# Patient Record
Sex: FEMALE | Race: WHITE | NOT HISPANIC OR LATINO | Employment: OTHER | URBAN - NONMETROPOLITAN AREA
[De-identification: names, ages, dates, MRNs, and addresses within clinical notes are randomized per-mention and may not be internally consistent; named-entity substitution may affect disease eponyms.]

---

## 2017-09-15 ENCOUNTER — HISTORY (OUTPATIENT)
Dept: FAMILY MEDICINE | Facility: OTHER | Age: 77
End: 2017-09-15

## 2017-09-15 ENCOUNTER — OFFICE VISIT - GICH (OUTPATIENT)
Dept: FAMILY MEDICINE | Facility: OTHER | Age: 77
End: 2017-09-15

## 2017-09-15 DIAGNOSIS — R30.0 DYSURIA: ICD-10-CM

## 2017-09-15 DIAGNOSIS — N30.00 ACUTE CYSTITIS WITHOUT HEMATURIA: ICD-10-CM

## 2017-09-15 LAB
BACTERIA URINE: ABNORMAL BACTERIA/HPF
BILIRUB UR QL: NEGATIVE
CLARITY, URINE: ABNORMAL CLARITY
COLOR UR: YELLOW COLOR
EPITHELIAL CELLS: ABNORMAL EPI/HPF
GLUCOSE URINE: NEGATIVE MG/DL
KETONES UR QL: NEGATIVE MG/DL
LEUKOCYTE ESTERASE URINE: ABNORMAL
NITRITE UR QL STRIP: POSITIVE
OCCULT BLOOD,URINE - HISTORICAL: ABNORMAL
PH UR: 6.5 [PH]
PROTEIN QUALITATIVE,URINE - HISTORICAL: NEGATIVE MG/DL
RBC - HISTORICAL: ABNORMAL /HPF
SP GR UR STRIP: 1.01
UROBILINOGEN,QUALITATIVE - HISTORICAL: NORMAL EU/DL
WBC - HISTORICAL: >100 /HPF

## 2017-09-17 ENCOUNTER — COMMUNICATION - GICH (OUTPATIENT)
Dept: FAMILY MEDICINE | Facility: OTHER | Age: 77
End: 2017-09-17

## 2017-09-17 DIAGNOSIS — N30.00 ACUTE CYSTITIS WITHOUT HEMATURIA: ICD-10-CM

## 2017-09-17 LAB
CULTURE - HISTORICAL: ABNORMAL
CULTURE - HISTORICAL: ABNORMAL
SUSCEPTIBILITY RESULT - HISTORICAL: ABNORMAL

## 2017-09-22 ENCOUNTER — COMMUNICATION - GICH (OUTPATIENT)
Dept: FAMILY MEDICINE | Facility: OTHER | Age: 77
End: 2017-09-22

## 2017-09-25 ENCOUNTER — AMBULATORY - GICH (OUTPATIENT)
Dept: FAMILY MEDICINE | Facility: OTHER | Age: 77
End: 2017-09-25

## 2017-09-25 ENCOUNTER — HISTORY (OUTPATIENT)
Dept: FAMILY MEDICINE | Facility: OTHER | Age: 77
End: 2017-09-25

## 2017-10-03 ENCOUNTER — HISTORY (OUTPATIENT)
Dept: FAMILY MEDICINE | Facility: OTHER | Age: 77
End: 2017-10-03

## 2017-10-03 ENCOUNTER — OFFICE VISIT - GICH (OUTPATIENT)
Dept: FAMILY MEDICINE | Facility: OTHER | Age: 77
End: 2017-10-03

## 2017-10-03 DIAGNOSIS — N39.0 URINARY TRACT INFECTION: ICD-10-CM

## 2017-10-03 DIAGNOSIS — M25.551 PAIN IN RIGHT HIP: ICD-10-CM

## 2017-10-03 DIAGNOSIS — L71.9 ROSACEA: ICD-10-CM

## 2017-10-03 DIAGNOSIS — R30.0 DYSURIA: ICD-10-CM

## 2017-10-03 DIAGNOSIS — N81.11 CYSTOCELE, MIDLINE: ICD-10-CM

## 2017-10-03 LAB
BACTERIA URINE: ABNORMAL BACTERIA/HPF
BILIRUB UR QL: NEGATIVE
CLARITY, URINE: ABNORMAL CLARITY
COLOR UR: YELLOW COLOR
EPITHELIAL CELLS: ABNORMAL EPI/HPF
GLUCOSE URINE: NEGATIVE MG/DL
KETONES UR QL: NEGATIVE MG/DL
LEUKOCYTE ESTERASE URINE: ABNORMAL
NITRITE UR QL STRIP: POSITIVE
OCCULT BLOOD,URINE - HISTORICAL: ABNORMAL
PH UR: 5.5 [PH]
PROTEIN QUALITATIVE,URINE - HISTORICAL: NEGATIVE MG/DL
RBC - HISTORICAL: ABNORMAL /HPF
SP GR UR STRIP: <=1.005
UROBILINOGEN,QUALITATIVE - HISTORICAL: NORMAL EU/DL
WBC - HISTORICAL: ABNORMAL /HPF

## 2017-12-05 ENCOUNTER — HISTORY (OUTPATIENT)
Dept: FAMILY MEDICINE | Facility: OTHER | Age: 77
End: 2017-12-05

## 2017-12-05 ENCOUNTER — OFFICE VISIT - GICH (OUTPATIENT)
Dept: FAMILY MEDICINE | Facility: OTHER | Age: 77
End: 2017-12-05

## 2017-12-05 DIAGNOSIS — N81.10 CYSTOCELE, UNSPECIFIED (CODE): ICD-10-CM

## 2017-12-05 DIAGNOSIS — R39.9 UNSPECIFIED SYMPTOMS AND SIGNS INVOLVING THE GENITOURINARY SYSTEM: ICD-10-CM

## 2017-12-05 LAB
BACTERIA URINE: ABNORMAL BACTERIA/HPF
BILIRUB UR QL: ABNORMAL
CLARITY, URINE: ABNORMAL CLARITY
COLOR UR: ABNORMAL COLOR
EPITHELIAL CELLS: ABNORMAL EPI/HPF
GLUCOSE URINE: ABNORMAL MG/DL
KETONES UR QL: ABNORMAL MG/DL
LEUKOCYTE ESTERASE URINE: ABNORMAL
NITRITE UR QL STRIP: ABNORMAL
OCCULT BLOOD,URINE - HISTORICAL: ABNORMAL
PH UR: ABNORMAL [PH]
PROTEIN QUALITATIVE,URINE - HISTORICAL: ABNORMAL MG/DL
RBC - HISTORICAL: ABNORMAL /HPF
SP GR UR STRIP: 1.01
UROBILINOGEN,QUALITATIVE - HISTORICAL: ABNORMAL EU/DL
WBC - HISTORICAL: ABNORMAL /HPF

## 2017-12-09 ENCOUNTER — AMBULATORY - GICH (OUTPATIENT)
Dept: SCHEDULING | Facility: OTHER | Age: 77
End: 2017-12-09

## 2017-12-27 NOTE — PROGRESS NOTES
"Patient Information     Patient Name MRN Sex     Carolyne Medley [id number] Female 1940      Progress Notes by Jackie Gtz MD at 10/3/2017  4:00 PM     Author:  Jackie Gtz MD Service:  (none) Author Type:  Physician     Filed:  10/3/2017  4:57 PM Encounter Date:  10/3/2017 Status:  Signed     :  Jackie Gtz MD (Physician)            SUBJECTIVE: Carolyne Medley is a 77 y.o. female who complains of intermittent burning with urination and has a rather large cystocele that at times causes more pressure and seems to bulge out more and at times to introitus but \"not below the opening\". Kegel exercises help.Had mild dysuria yesterday and up every hour during the night last night but not this afternoon. Had recent UTI and medication started was Septra but not sensitive so changed to macrodantin. Urine culture reviewed. Has had YASMANY with BSO and bladder suspension and then recurrence . She really thinks that the UTI was totally cleared up she would not have as much problem with the sensation of descensus.    OBJECTIVE:  /70  Pulse 62  Wt 61.7 kg (136 lb)  BMI 24.56 kg/m2     Appears well, in no apparent distress. The abdomen is soft without tenderness, guarding, mass, rebound or organomegaly. No CVA tenderness or inguinal adenopathy noted.   Large gaping introitus and with minimal Valsalva anterior vaginal wall and cystocele protruded 2 and out of the introitus.  Results for orders placed or performed in visit on 10/03/17      URINALYSIS W REFLEX MICROSCOPIC IF POSITIVE      Result  Value Ref Range    COLOR                     Yellow Yellow Color    CLARITY                   Slightly Cloudy (A) Clear Clarity    SPECIFIC GRAVITY,URINE    <=1.005 (A) 1.010, 1.015, 1.020, 1.025                    PH,URINE                  5.5 6.0, 7.0, 8.0, 5.5, 6.5, 7.5, 8.5                    UROBILINOGEN,QUALITATIVE  Normal Normal EU/dl    PROTEIN, URINE Negative Negative mg/dL    GLUCOSE, " URINE Negative Negative mg/dL    KETONES,URINE             Negative Negative mg/dL    BILIRUBIN,URINE           Negative Negative                    OCCULT BLOOD,URINE        Small (A) Negative                    NITRITE                   Positive (A) Negative                    LEUKOCYTE ESTERASE        Large (A) Negative                   URINALYSIS MICROSCOPIC      Result  Value Ref Range    RBC 0-2 0-2, None Seen /HPF    WBC 26-50 (A) 0-2, 3-5, None Seen /HPF    BACTERIA                  Many (A) None Seen, Rare, Occasional, Few Bacteria/HPF    EPITHELIAL CELLS          Few None Seen, Few Epi/HPF     I have personally reviewed the labs listed above.  Urine culture pending.    ASSESSMENT:   1. Recurrent UTI    2. Midline cystocele    3. Burning with urination    4. Rosacea    5. Right hip pain            PLAN:   Treatment per orders and prescription for Cipro given pending culture results. - also push fluids, may use Pyridium OTC prn.   Continue Kegel exercises.  Discussed other options short of surgery such as a pessary which she could consider but would have to see GYN to discuss.  Call or return to clinic prn if these symptoms worsen or fail to improve as anticipated.  Jackie Gtz MD  4:57 PM 10/3/2017

## 2017-12-28 NOTE — TELEPHONE ENCOUNTER
Patient Information     Patient Name Carolyne Tiwari [id number] Female 1940      Telephone Encounter by Sherrie Rahman NP at 2017  3:22 PM     Author:  Sherrie Rahman NP Service:  (none) Author Type:  PHYS- Nurse Practitioner     Filed:  2017  3:24 PM Encounter Date:  2017 Status:  Signed     :  Sherrie Rahman NP (PHYS- Nurse Practitioner)            She should keep area clean and moist. May use topical treatments such as vaseline to help with irritation. I would recommend she f/u with Dr Gtz to discuss prolapsed uterus and possible referrals. SHERRIE RAHMAN NP ....................  2017   3:23 PM

## 2017-12-28 NOTE — TELEPHONE ENCOUNTER
Patient Information     Patient Name MRN Carolyne Moran [id number] Female 1940      Telephone Encounter by Diane Loco at 2017  3:02 PM     Author:  Diane Loco Service:  (none) Author Type:  NURS- Student Practical Nurse     Filed:  2017  3:05 PM Encounter Date:  2017 Status:  Signed     :  Diane Loco (NURS- Student Practical Nurse)            Patient has 1 more day of antibiotic and states that it has helped. Patient states that she has a prolapsed bladder and it protrudes and gets irritation. Patient states that makes her feels as though she has a bladder infection.  Patient would like to know what she should do for it. Patient would like to know if she could get a referral for urology.  Please advise. Diane Loco LPN .............2017  3:05 PM

## 2017-12-28 NOTE — TELEPHONE ENCOUNTER
Patient Information     Patient Name MRN Carolyne Moran [id number] Female 1940      Telephone Encounter by Diane Loco at 2017  4:14 PM     Author:  Diane Loco Service:  (none) Author Type:  NURS- Student Practical Nurse     Filed:  2017  4:14 PM Encounter Date:  2017 Status:  Signed     :  Diane Loco (NURS- Student Practical Nurse)            Spoke with patient and relayed results. Transferred to scheduling line. Diane Loco LPN .............2017  4:14 PM

## 2017-12-28 NOTE — TELEPHONE ENCOUNTER
Patient Information     Patient Name Carolyne Tiwari [id number] Female 1940      Telephone Encounter by Sherrie Rahman NP at 2017  1:36 PM     Author:  Sherrie Rahman NP Service:  (none) Author Type:  PHYS- Nurse Practitioner     Filed:  2017  1:38 PM Encounter Date:  2017 Status:  Signed     :  Sherrie Rahman NP (PHYS- Nurse Practitioner)            Please call and let her know that UC shows infection that is not covered by Bactrim. I sent a new Rx for macrobid twice daily for 7 days. She should stop Bactrim. SHERRIE RAHMAN NP ....................  2017   1:38 PM

## 2017-12-28 NOTE — TELEPHONE ENCOUNTER
Patient Information     Patient Name MRN Carolyne Moran [id number] Female 1940      Telephone Encounter by Marily Swan at 2017  2:09 PM     Author:  Marily Swan Service:  (none) Author Type:  NURS- Student Practical Nurse     Filed:  2017  2:09 PM Encounter Date:  2017 Status:  Signed     :  Marily Swan (NURS- Student Practical Nurse)            Left message to call back  ....................  2017   2:09 PM   Marily Swan LPN............................ 2017 2:09 PM

## 2017-12-28 NOTE — PATIENT INSTRUCTIONS
Patient Information     Patient Name Carolyne Tiwari [id number] Female 1940      Patient Instructions by Sherrie Rodriguez NP at 9/15/2017 11:15 AM     Author:  Sherrie Rodriguez NP Service:  (none) Author Type:  PHYS- Nurse Practitioner     Filed:  9/15/2017 11:58 AM Encounter Date:  9/15/2017 Status:  Signed     :  Sherrie Rodriguez NP (PHYS- Nurse Practitioner)            Antibiotics per order.  AZO may be purchased over the counter and can help with symptoms until the antibiotics start working  Drink plenty of fluids.   Return to clinic if any fevers, vomiting, or flank pain develops as this may be a sign the infection has moved to your kidney's.  We have initiated a urine culture. If any changes are needed in your antibiotics, we will notify you when the results have returned.

## 2017-12-28 NOTE — TELEPHONE ENCOUNTER
Patient Information     Patient Name MRN Carolyne Moran [id number] Female 1940      Telephone Encounter by Marily Swan at 2017  3:47 PM     Author:  Marily Swan Service:  (none) Author Type:  NURS- Student Practical Nurse     Filed:  2017  3:48 PM Encounter Date:  2017 Status:  Signed     :  Marily Swan (NURS- Student Practical Nurse)            Patient returned this writer's phone call. After proper verification, informed patient of below information. Patient states understanding and no further questions at this time.    Marily Swan LPN............................ 2017 3:48 PM

## 2017-12-28 NOTE — PATIENT INSTRUCTIONS
Patient Information     Patient Name MRCarolyne Calderon [id number] Female 1940      Patient Instructions by Jackie Gtz MD at 10/3/2017  4:47 PM     Author:  Jackie Gtz MD Service:  (none) Author Type:  Physician     Filed:  10/3/2017  4:47 PM Encounter Date:  10/3/2017 Status:  Signed     :  Jackie Gtz MD (Physician)               Index [employer]ian   Urinary Tract Infection in Women   ________________________________________________________________________  KEY POINTS    A urinary tract infection is an infection of your kidneys, ureters, bladder, or urethra.    Your healthcare provider will likely prescribe an antibiotic and medicine to help relieve burning and discomfort.    Follow the full course of treatment prescribed by your healthcare provider. If you were prescribed an antibiotic, take all of it as prescribed, even if your symptoms are gone.  ________________________________________________________________________  What is a urinary tract infection?  Urinary tract infection (UTI) is an infection of one or more parts of the urinary tract. The urinary tract includes your:    Kidneys, which make urine    Ureters, which are the tubes that carry urine from the kidneys to the bladder    Bladder, which stores urine    Urethra, which is the tube that drains urine from the bladder  What is the cause?  Urinary tract infection is usually caused by bacteria. Normally the urinary tract does not have any bacteria or other organisms in it. Bacteria that cause a UTI often spread from the rectum or vagina to the urethra and then to the bladder or kidneys. Urinary tract infection is common in women because the urethra is short. This makes it easier for bacteria to move up to the bladder. Sometimes bacteria spread from another part of the body through the bloodstream to the urinary tract.  Some of the things that can lead to an infection are:    A blockage in the urinary tract, such  as a kidney stone    A sexually transmitted disease or infection (also called an STD or STI)    Getting older, when it may get harder to empty and flush out the bladder completely    Having medical problems such as diabetes, a problem with the immune system, sickle cell anemia, stroke, kidney stones, or any illness or disability that makes it hard to empty your bladder completely    Use of a catheter to drain the bladder    Scarring in the urinary tract from previous infections or surgery  You are more likely to have an infection if:    You just started having sex or have a new sex partner    You are past menopause    You are pregnant  What are the symptoms?  Symptoms may include:    Urinating more often    Feeling an urgent need to urinate or feeling that your bladder is always full    Pain or burning when you urinate    Pain in your lower belly, low back, or your side    Urine that smells bad    Urine that looks cloudy, reddish, or bloody    Fever and chills or sweating    Nausea and vomiting    Leaking of urine    Pain during sex  How is it diagnosed?  Your healthcare provider will ask about your symptoms and medical history and examine you. Tests to diagnose a simple urinary tract infection may include:    Urine tests    Blood tests  If you are having more serious symptoms or frequent infections, you may need one or more tests:    An intravenous pyelogram (IVP), which is a series of X-rays taken after your healthcare provider injects contrast dye into your blood vessels to look for blockages in your kidneys and urinary tract    An ultrasound, which uses sound waves to show pictures of the kidneys and urinary tract    A pelvic exam    A cystoscopy, which uses a slim, flexible, lighted tube passed through your urethra into your bladder, and usually done by a specialist called a urologist  How is it treated?  Your healthcare provider will most likely prescribe an antibiotic and medicine to help relieve burning and  discomfort. Prompt treatment of a UTI usually relieves the symptoms in 1 to 2 days. If your infection has been causing symptoms for several days before treatment or if you have a fever, it may take longer to feel better.  It s important to get prompt treatment for a UTI. If the infection is not treated, it could damage your kidneys and make you very sick. If the infection spreads to your blood, it can be life-threatening. If you are very sick, you may need to be in the hospital and get antibiotics by IV.  How can I take care of myself?  Follow the full course of treatment prescribed by your healthcare provider. If you were prescribed an antibiotic medicine, take the antibiotics for as long as your healthcare provider prescribes, even if you feel better. If you stop taking the medicine too soon, you may not kill all of the bacteria and you may get sick again. If you have side effects from your medicine, talk to your healthcare provider.  Ask your provider:    How and when you will get your test results    How long it will take to recover    If there are activities you should avoid and when you can return to your normal activities    How to take care of yourself at home    What symptoms or problems you should watch for and what to do if you have them    Make sure you know when you should come back for a checkup.    Drink plenty of water each day to cleanse your bladder and urinary tract unless your healthcare provider has told you to limit how much fluid you drink.    Soaking in a tub of warm water for 20 to 30 minutes may help relieve pain.  How can I help prevent urinary tract infection?  You can help prevent UTIs if you:    Drink enough liquids to keep your urine light yellow in color.    Drink a glass of cranberry juice each day. The juice should be real cranberry juice, not a cranberry-flavored drink.    Don t wait to go to the bathroom if you feel the need to urinate.    Practice safe sex:    Ask your healthcare  provider which type of condom, diaphragm, or other birth control is right for you.    Urinate soon after sex.    Keep your genital area clean. If you want to have vaginal sex after anal sex, both partners should wash their genitals first.    Empty your bladder completely when you urinate.    Don t wear a wet bathing suit for long periods of time.    Don t use irritating cosmetics or chemicals in your genital area. This includes, for example, strong soaps, feminine hygiene sprays, douches, scented tampons, sanitary napkins, or panty liners.    Keep your vaginal area clean. Wiping from front to back after using the toilet may help prevent infections. Use mild, unscented soap to wash your genital area gently each time you bathe or shower.    Wear underwear that is all cotton or has a cotton crotch. Pantyhose should also have a cotton crotch. Cotton absorbs moisture better than nylon. Change underwear and pantyhose every day.    During pregnancy, tell your healthcare provider if you often have urinary tract problems.  If you have reached menopause and are not taking estrogen, prescription estrogen vaginal cream may help prevent bladder infections.  Developed by [employer].  Adult Advisor 2016.2 published by [employer].  Last modified: 2016-04-27  Last reviewed: 2016-04-26  This content is reviewed periodically and is subject to change as new health information becomes available. The information is intended to inform and educate and is not a replacement for medical evaluation, advice, diagnosis or treatment by a healthcare professional.  References   Adult Advisor 2016.2 Index    Copyright   2016 [employer], a division of [employer] All rights reserved.

## 2017-12-28 NOTE — PROGRESS NOTES
Patient Information     Patient Name MRN Sex Carolyne Mann [id number] Female 1940      Progress Notes by Sherrie Rordiguez NP at 9/15/2017 11:15 AM     Author:  Sherrie Rodriguez NP Service:  (none) Author Type:  PHYS- Nurse Practitioner     Filed:  9/15/2017  1:28 PM Encounter Date:  9/15/2017 Status:  Signed     :  Sherrie Rodriguez NP (PHYS- Nurse Practitioner)            HPI:    Carolyne Medley is a 77 y.o. female who presents to clinic today for urinary concerns. She has had 2 day history of burning, frequency, cloudy urine. Having some pelvic discomfort. No fevers. No hematuria. No n/v or back pain. She has taken doxycycline 50 mg x4 tablets for sx that she has on hand for her rosacea. No hx of frequent UTI's. Does not smoke.     Past Medical History:     Diagnosis  Date     H/O rosacea      History of pregnancy           Past Surgical History:      Procedure  Laterality Date     YASMANY AND BSO      in the [facility]       Social History        Substance Use Topics          Smoking status:   Former Smoker      Quit date:  1964      Smokeless tobacco:   Never Used      Alcohol use   0.5 oz/week     1 Shots of liquor per week        Comment: occasional       Current Outpatient Prescriptions       Medication  Sig Dispense Refill     albuterol HFA (VENTOLIN HFA) 90 mcg/actuation inhaler Inhale 2 Puffs by mouth every 6 hours if needed for Shortness Of Breath. 1 Inhaler 1     Doxycycline Monohydrate (VIBRAMYCIN) 50 mg capsule Take 1 capsule by mouth 2 times daily. 60 capsule 2     estrogens conjugated (PREMARIN) 0.625 mg tablet Take 1 tablet by mouth once daily. 45 tablet 6     metroNIDAZOLE 0.75 % cream Apply  topically to affected area(s) once daily. 45 g 6     naproxen (NAPROSYN) 500 mg tablet Take 1 tablet by mouth 2 times daily with meals. 60 tablet 4     No current facility-administered medications for this visit.      Medications have been reviewed by me and are current to the best  of my knowledge and ability.    Allergies     Allergen  Reactions     Latex Rash     Penicillins Rash       ROS:  Pertinent positives and negatives are noted in HPI.    EXAM:  General appearance: well appearing female, in no acute distress  Respiratory: clear to auscultation bilaterally  Cardiac: RRR with no murmurs  Abdomen: soft, nontender, no masses or organomegally, no CVAT  Psychological: normal affect, alert and pleasant  Lab:   Results for orders placed or performed in visit on 09/15/17      URINALYSIS W REFLEX MICROSCOPIC IF POSITIVE      Result  Value Ref Range    COLOR                     Yellow Yellow Color    CLARITY                   Cloudy (A) Clear Clarity    SPECIFIC GRAVITY,URINE    1.010 1.010, 1.015, 1.020, 1.025                    PH,URINE                  6.5 6.0, 7.0, 8.0, 5.5, 6.5, 7.5, 8.5                    UROBILINOGEN,QUALITATIVE  Normal Normal EU/dl    PROTEIN, URINE Negative Negative mg/dL    GLUCOSE, URINE Negative Negative mg/dL    KETONES,URINE             Negative Negative mg/dL    BILIRUBIN,URINE           Negative Negative                    OCCULT BLOOD,URINE        Small (A) Negative                    NITRITE                   Positive (A) Negative                    LEUKOCYTE ESTERASE        Large (A) Negative                   URINALYSIS MICROSCOPIC      Result  Value Ref Range    RBC 3-5 (A) 0-2, None Seen /HPF    WBC >100 (A) 0-2, 3-5, None Seen /HPF    BACTERIA                  Many (A) None Seen, Rare, Occasional, Few Bacteria/HPF    EPITHELIAL CELLS          Few None Seen, Few Epi/HPF         ASSESSMENT/PLAN:    ICD-10-CM    1. Acute cystitis without hematuria N30.00 trimethoprim-sulfamethoxazole, 160-800 mg, (BACTRIM DS, SEPTRA DS) tablet      URINE CULTURE      URINE CULTURE   2. Dysuria R30.0 URINALYSIS W REFLEX MICROSCOPIC IF POSITIVE      URINALYSIS W REFLEX MICROSCOPIC IF POSITIVE      URINALYSIS MICROSCOPIC      URINALYSIS MICROSCOPIC      UA with many bacteria,  nitrites, small blood, large leukocytes. UC pending. Tx empirically with bactrim. Reviewed need to complete all antibiotics. Discussed typical course of illness, symptomatic treatment and when to return to clinic. Patient in agreement with plan and all questions were answered.     Patient Instructions   Antibiotics per order.  AZO may be purchased over the counter and can help with symptoms until the antibiotics start working  Drink plenty of fluids.   Return to clinic if any fevers, vomiting, or flank pain develops as this may be a sign the infection has moved to your kidney's.  We have initiated a urine culture. If any changes are needed in your antibiotics, we will notify you when the results have returned.

## 2017-12-30 NOTE — NURSING NOTE
Patient Information     Patient Name MRN Carolyne Moran [id number] Female 1940      Nursing Note by Diane Loco at 9/15/2017 11:15 AM     Author:  Diane Loco Service:  (none) Author Type:  NURS- Student Practical Nurse     Filed:  9/15/2017 11:41 AM Encounter Date:  9/15/2017 Status:  Signed     :  Diane Loco (NURS- Student Practical Nurse)            Patient presents with dysuria, frequency, cloudy urine for 2 days. Diane Loco LPN .............9/15/2017  11:34 AM

## 2017-12-31 ENCOUNTER — COMMUNICATION - GICH (OUTPATIENT)
Dept: FAMILY MEDICINE | Facility: OTHER | Age: 77
End: 2017-12-31

## 2017-12-31 DIAGNOSIS — M25.551 PAIN IN RIGHT HIP: ICD-10-CM

## 2018-01-04 ENCOUNTER — OFFICE VISIT - GICH (OUTPATIENT)
Dept: OBGYN | Facility: OTHER | Age: 78
End: 2018-01-04

## 2018-01-04 ENCOUNTER — HISTORY (OUTPATIENT)
Dept: OBGYN | Facility: OTHER | Age: 78
End: 2018-01-04

## 2018-01-04 DIAGNOSIS — Z79.890 HORMONE REPLACEMENT THERAPY (POSTMENOPAUSAL): ICD-10-CM

## 2018-01-04 DIAGNOSIS — N81.9 FEMALE GENITAL PROLAPSE: ICD-10-CM

## 2018-01-04 DIAGNOSIS — N30.00 ACUTE CYSTITIS WITHOUT HEMATURIA: ICD-10-CM

## 2018-01-06 LAB — CULTURE - HISTORICAL: NORMAL

## 2018-01-24 ENCOUNTER — DOCUMENTATION ONLY (OUTPATIENT)
Dept: FAMILY MEDICINE | Facility: OTHER | Age: 78
End: 2018-01-24

## 2018-01-24 PROBLEM — L71.9 ACNE ROSACEA: Status: ACTIVE | Noted: 2018-01-24

## 2018-01-24 PROBLEM — M54.9 BACKACHE: Status: ACTIVE | Noted: 2018-01-24

## 2018-01-24 PROBLEM — N81.10 FEMALE CYSTOCELE: Status: ACTIVE | Noted: 2017-12-05

## 2018-01-24 RX ORDER — NAPROXEN 500 MG/1
1 TABLET ORAL DAILY PRN
COMMUNITY
Start: 2018-01-04

## 2018-01-24 RX ORDER — ALBUTEROL SULFATE 90 UG/1
2 AEROSOL, METERED RESPIRATORY (INHALATION) EVERY 6 HOURS PRN
COMMUNITY
Start: 2015-05-12 | End: 2019-10-17

## 2018-01-24 RX ORDER — DIPHENOXYLATE HYDROCHLORIDE AND ATROPINE SULFATE 2.5; .025 MG/1; MG/1
1 TABLET ORAL DAILY
COMMUNITY
Start: 2017-12-05

## 2018-01-24 RX ORDER — DOXYCYCLINE 50 MG/1
CAPSULE ORAL
COMMUNITY
Start: 2017-10-03 | End: 2018-09-17

## 2018-01-25 VITALS — DIASTOLIC BLOOD PRESSURE: 70 MMHG | SYSTOLIC BLOOD PRESSURE: 100 MMHG | HEART RATE: 62 BPM | WEIGHT: 136 LBS

## 2018-01-25 VITALS
HEIGHT: 62 IN | BODY MASS INDEX: 25.1 KG/M2 | SYSTOLIC BLOOD PRESSURE: 112 MMHG | TEMPERATURE: 97.5 F | HEART RATE: 73 BPM | DIASTOLIC BLOOD PRESSURE: 64 MMHG | WEIGHT: 136.4 LBS

## 2018-02-09 VITALS
SYSTOLIC BLOOD PRESSURE: 120 MMHG | HEART RATE: 70 BPM | BODY MASS INDEX: 23.57 KG/M2 | DIASTOLIC BLOOD PRESSURE: 82 MMHG | WEIGHT: 133 LBS | HEIGHT: 63 IN

## 2018-02-09 VITALS
HEART RATE: 64 BPM | BODY MASS INDEX: 24.22 KG/M2 | TEMPERATURE: 96.5 F | WEIGHT: 135 LBS | SYSTOLIC BLOOD PRESSURE: 120 MMHG | DIASTOLIC BLOOD PRESSURE: 68 MMHG

## 2018-02-12 NOTE — PATIENT INSTRUCTIONS
Patient Information     Patient Name MRN Carolyne Moran [id number] Female 1940      Patient Instructions by Jackie Gtz MD at 2017 11:00 AM     Author:  Jackie Gtz MD Service:  (none) Author Type:  Physician     Filed:  2017 11:37 AM Encounter Date:  2017 Status:  Signed     :  Jackie Gtz MD (Physician)            Take cipro and you will be notified of urine culture results.

## 2018-02-12 NOTE — PROGRESS NOTES
Patient Information     Patient Name MRN Sex     Carolyne Medley [id number] Female 1940      Progress Notes by Karen Chun MD at 2018 11:15 AM     Author:  Karen Chun MD Service:  (none) Author Type:  Physician     Filed:  2018 12:55 PM Encounter Date:  2018 Status:  Signed     :  Karen Chun MD (Physician)            [facility]  Gynecology Consult    CC: cystocele    HPI: Carolyne Medley is a 77 y.o.  who presents in consultation for cystocele, recurrent UTIs. She reports a history of recurrent cystocele. She had an abdominal hysterectomy with a bladder repair in . This worked for about 20 years, then recurred and had a vaginal repair in . She does not think she has ever had a mesh procedure. This repair failed again a few years ago. The bulge is there but not particularly bothersome. She reports 2-3 UTIs this fall, which she only infrequently had before. She is wondering if this is related to her cystocele. When she urinates, she feels like she needs to do a Kegel to pull the cystocele back inside her vagina in order to completely empty her bladder but reports she is able to empty. She denies splinting or urinary leakage. She has some difficulty evacuating her bowels completely and also endorses hard stools.     OBHx: ,  x1    Past Medical History:     Diagnosis  Date     H/O rosacea      History of pregnancy              Past Surgical History:      Procedure  Laterality Date     CYSTOCELE REPAIR      [place]       YASMANY AND BSO      in the [facility]. With bladder suspension         Family History       Problem   Relation Age of Onset     Osteoporosis  Mother      Other  Mother      Dementia       Cancer-breast  No Family History        Social History        Substance Use Topics          Smoking status:   Former Smoker      Quit date:  1964      Smokeless tobacco:   Never Used      Alcohol use   0.5  oz/week     1 Shots of liquor per week        Comment: occasional     Lives with her  of 52 years. Retired, enjoys sewing.     Current Outpatient Prescriptions on File Prior to Visit       Medication  Sig Dispense Refill     albuterol HFA (VENTOLIN HFA) 90 mcg/actuation inhaler Inhale 2 Puffs by mouth every 6 hours if needed for Shortness Of Breath. 1 Inhaler 1     Doxycycline Monohydrate (VIBRAMYCIN) 50 mg capsule Take 1 capsule by mouth each time if needed for Other (Specify). 60 capsule 2     metroNIDAZOLE 0.75 % cream Apply  topically to affected area(s) once daily. 45 g 6     multivitamin (MVI) tablet Take 1 tablet by mouth once daily.  0     naproxen (NAPROSYN) 500 mg tablet Take one tablet by mouth once daily as needed. 60 tablet 4     No current facility-administered medications on file prior to visit.        Allergies:   Allergies      Allergen   Reactions     Latex  Rash     Nickel  Rash     Rash,runny nose, throat issue      Penicillins  Rash       Objective:   /68 (Cuff Site: Right Arm, Position: Sitting, Cuff Size: Adult Regular)  Pulse 64  Temp 96.5  F (35.8  C) (Tympanic)   Wt 61.2 kg (135 lb)  BMI 24.22 kg/m2  Constitutional: Healthy appearing female, no acute distress  Resp: nonlabored  Pelvic Exam - : External genitalia normal, healthy tissue.  No obvious excoriations, lesions, or rashes. Bartholins, urethra, skeins normal.  Normal pink vaginal mucosa. Uterus and cervix surgically absent. Well healed cuff without lesions. Grade 2 vault prolapse, grade 2-3 cystocele with valsalva, grade 1-2 rectocele with valsalva. Urethra well supported. No pelvic masses or urethral tenderness.   Psychiatric: mentation appears normal and affect normal    Straight cath post-void residual 90cc clear urine    Assessment/Plan:  77 y.o.  presenting with recurrent cystocele s/p several surgical repairs and UTIs. Discussed how cystoceles can cause urinary retention which can lead to recurrent  UTIs, however, her post-void residual volume was within normal limits. She also has constipation, which can increase risk of UTIs. Discussed managing constipation to see if this may decrease recurrence of UTI. Also discussed management options for prolapse, including pessary and surgical options. She has already failed two native tissue repairs and therefore would need to see a urogynecologist if she desired repair. She declines pessary and surgical management for now. Will treat constipation and RTC if UTI recurs despite this with plan for pessary placement. Also discussed her HRT. She started taking it for bone health in 1991 after her hysterectomy. Discussed that HRT is no longer indicated for primary disease prevention and is only indicated for vasomotor symptoms. Recommend stopping HRT and discussed risks of continue use, including VTE and stroke. The patient is reluctant to stop it now but will consider discontinuing this.   - RTC prn    30 total minutes spent with the patient with >50% of the time spent counseling the patient on cystocele     Karen Chun MD  OB/GYN  1/4/2018 12:35 PM

## 2018-02-12 NOTE — NURSING NOTE
Patient Information     Patient Name MRN Sex Carolyne Mann [id number] Female 1940      Nursing Note by Isabell Kathleen at 2017 11:00 AM     Author:  Isabell Kathleen Service:  (none) Author Type:  (none)     Filed:  2017 11:21 AM Encounter Date:  2017 Status:  Signed     :  Isabell Kathleen            Carolyne Medley is a 77 y.o. Female here with c/o urinary frequency, urgency and burning. States took Azo last night.  Tori Kathleen LPN ...... 2017 11:09 AM

## 2018-02-12 NOTE — PROGRESS NOTES
"Patient Information     Patient Name MRN Sex Carolyne Mann [id number] Female 1940      Progress Notes by Jackie Gtz MD at 2017 11:00 AM     Author:  Jackie Gtz MD Service:  (none) Author Type:  Physician     Filed:  2017 11:45 AM Encounter Date:  2017 Status:  Signed     :  Jackie Gtz MD (Physician)            SUBJECTIVE: Carolyne Medley is a 77 y.o. female who complains of urinary frequency, urgency and dysuria x 2-3 days, without flank pain, fever, chills, or abnormal vaginal discharge or bleeding. She has a known cystocele which is somewhat symptomatic. She has had previous procedures please see last note. We do not have operative reports of her procedure in  and will attempt to get those from [place]. She took Azo last night so her urine is orange this morning.    OBJECTIVE:  /82  Pulse 70  Ht 1.59 m (5' 2.6\")  Wt 60.3 kg (133 lb)  BMI 23.86 kg/m2   Appears well, in no apparent distress.  Results for orders placed or performed in visit on 17      URINALYSIS W REFLEX MICROSCOPIC IF POSITIVE      Result  Value Ref Range    COLOR                     Orange (A) Yellow Color    CLARITY                   Slightly Cloudy (A) Clear Clarity    SPECIFIC GRAVITY,URINE    1.010 1.010, 1.015, 1.020, 1.025                    PH,URINE                  Unable to interpret due to interfering substance (A) 6.0, 7.0, 8.0, 5.5, 6.5, 7.5, 8.5                    UROBILINOGEN,QUALITATIVE  Unable to interpret due to interfering substance (A) Normal EU/dl    PROTEIN, URINE Unable to interpret due to interfering substance (A) Negative mg/dL    GLUCOSE, URINE Unable to interpret due to interfering substance (A) Negative mg/dL    KETONES,URINE             Unable to interpret due to interfering substance (A) Negative mg/dL    BILIRUBIN,URINE           Unable to interpret due to interfering substance (A) Negative                    OCCULT BLOOD,URINE        " Unable to interpret due to interfering substance (A) Negative                    NITRITE                   Unable to interpret due to interfering substance (A) Negative                    LEUKOCYTE ESTERASE        Unable to interpret due to interfering substance (A) Negative                   URINALYSIS MICROSCOPIC      Result  Value Ref Range    RBC 3-5 (A) 0-2, None Seen /HPF    WBC 26-50 (A) 0-2, 3-5, None Seen /HPF    BACTERIA                  Moderate (A) None Seen, Rare, Occasional, Few Bacteria/HPF    EPITHELIAL CELLS          Few None Seen, Few Epi/HPF     I have personally reviewed the labs listed above.  Urine culture pending.    ASSESSMENT:   1. UTI symptoms    2. Female cystocele          PLAN:   Treatment started with Cipro pending urine culture. She has recently had a UTI which was a Klebsiella in October adequately covered. With her cystocele this may be a recurrent problem and I suggested consideration of consultation at this point.  Jackie Gtz MD  11:37 AM 12/5/2017   Portions of this dictation were created using the Dragon Nuance voice recognition system. Proofreading was completed but there may be errors in text.

## 2018-02-12 NOTE — TELEPHONE ENCOUNTER
Patient Information     Patient Name MRN Sex Carolyne Mann [id number] Female 1940      Telephone Encounter by Kurt Lisa RN at 1/3/2018  4:52 PM     Author:  Kurt Lisa RN Service:  (none) Author Type:  NURS- Registered Nurse     Filed:  2018  8:38 AM Encounter Date:  2017 Status:  Signed     :  Kurt Lisa RN (NURS- Registered Nurse)            This is a Refill request from: [facility] pharmacy  Name of Medication: Naproxen  Quantity requested: 60 tabs with 4 refills  Last fill date: 16 for 60 tabs as per rx request  Due for refill: As per rx request, yes  Last visit with LG COTTRELL was on: 2016 in [facility]  PCP:  Jackie Gtz MD  Controlled Substance Agreement: N/A   Diagnosis r/t this medication request: Right hip pain    Chart review shows that PCP saw patient last on 10/3/17 for diagnosis associated with rx as requested. Writer is unable to fill rx as requested at this time as rx is listed as historical on patient's active med list. Rx as requested also doesn't match current rx in patient's chart. Call placed to patient to discuss. Writer was unable to reach patient at this time. Writer will update sig on rx as requested to match current sig in patient's chart. Will route rx request to PCP for her consideration/approval at this time.    Unable to complete prescription refill per RN Medication Refill Policy.................... Kurt Lisa RN ....................  2018   8:30 AM

## 2018-02-12 NOTE — NURSING NOTE
Patient Information     Patient Name MRN Carolyne Moran [id number] Female 1940      Nursing Note by Louise Lamb at 2018 11:15 AM     Author:  Louise Lamb Service:  (none) Author Type:  (none)     Filed:  2018 12:06 PM Encounter Date:  2018 Status:  Signed     :  Louise Lamb            Patient presents today as a consult for a cystocele.  Louise Lamb LPN  2018  11:22 AM

## 2018-03-15 ENCOUNTER — OFFICE VISIT (OUTPATIENT)
Dept: FAMILY MEDICINE | Facility: OTHER | Age: 78
End: 2018-03-15
Attending: FAMILY MEDICINE
Payer: MEDICARE

## 2018-03-15 VITALS
TEMPERATURE: 97.3 F | SYSTOLIC BLOOD PRESSURE: 102 MMHG | HEART RATE: 68 BPM | WEIGHT: 131 LBS | BODY MASS INDEX: 23.5 KG/M2 | DIASTOLIC BLOOD PRESSURE: 82 MMHG

## 2018-03-15 DIAGNOSIS — R39.9 UTI SYMPTOMS: Primary | ICD-10-CM

## 2018-03-15 PROBLEM — H40.1430: Status: ACTIVE | Noted: 2017-12-22

## 2018-03-15 PROBLEM — H25.13 AGE-RELATED NUCLEAR CATARACT, BILATERAL: Status: ACTIVE | Noted: 2017-12-22

## 2018-03-15 LAB
ALBUMIN UR-MCNC: NEGATIVE MG/DL
APPEARANCE UR: CLEAR
BACTERIA #/AREA URNS HPF: ABNORMAL /HPF
BILIRUB UR QL STRIP: NEGATIVE
COLOR UR AUTO: YELLOW
GLUCOSE UR STRIP-MCNC: NEGATIVE MG/DL
HGB UR QL STRIP: NEGATIVE
KETONES UR STRIP-MCNC: NEGATIVE MG/DL
LEUKOCYTE ESTERASE UR QL STRIP: ABNORMAL
NITRATE UR QL: NEGATIVE
PH UR STRIP: 6 PH (ref 5–7)
RBC #/AREA URNS AUTO: ABNORMAL /HPF
SOURCE: ABNORMAL
SP GR UR STRIP: <1.005 (ref 1–1.03)
UROBILINOGEN UR STRIP-ACNC: 0.2 EU/DL (ref 0.2–1)
WBC #/AREA URNS AUTO: ABNORMAL /HPF

## 2018-03-15 PROCEDURE — G0463 HOSPITAL OUTPT CLINIC VISIT: HCPCS

## 2018-03-15 PROCEDURE — 81001 URINALYSIS AUTO W/SCOPE: CPT | Performed by: FAMILY MEDICINE

## 2018-03-15 PROCEDURE — 87086 URINE CULTURE/COLONY COUNT: CPT | Performed by: FAMILY MEDICINE

## 2018-03-15 PROCEDURE — 99213 OFFICE O/P EST LOW 20 MIN: CPT | Performed by: FAMILY MEDICINE

## 2018-03-15 NOTE — MR AVS SNAPSHOT
After Visit Summary   3/15/2018    Carolyne Medley    MRN: [id number]           Patient Information     Date Of Birth          1940        Visit Information        Provider Department      3/15/2018 2:30 PM Jackie Gtz MD [facility]        Today's Diagnoses     UTI symptoms    -  1      Care Instructions      Dysuria with Uncertain Cause (Adult)    The urethra is the tube that allows urine to pass out of the body. In a woman, the urethra is the opening above the vagina. In men, the urethra is the opening on the tip of the penis. Dysuria is the feeling of pain or burning in the urethra when passing urine.  Dysuria can be caused by anything that irritates or inflames the urethra. An infection or chemical irritation can cause this reaction. A bladder infection is the most common cause of dysuria in adults. A urine test can diagnose this. A bladder infection needs antibiotic treatment.  Soaps, lotions, colognes and feminine hygiene products can cause dysuria. So can birth control jellies, creams, and foams. It will go away 1 to 3 days after using these irritants.  Sexually transmitted diseases (STDs) such as chlamydia or gonorrhea can cause dysuria. Your healthcare provider may take a culture sample. Your provider may start you on antibiotic medicine before the culture test returns.  In women who have gone through menopause, dysuria can be from dryness in the lining of the urethra. This can be treated with hormones. Dysuria becomes long-term (chronic) when it lasts for weeks or months. You may need to see a specialist (urologist) to diagnose and treat chronic dysuria.  Home care  These home care tips may help:    Don't use any chemicals or products that you think may be causing your symptoms.    If you were given a prescription medicine, take as directed. Be sure to take it until it is all used up.    If a culture was taken, don't have sex until you have been told that  it is negative. This means you don't have an infection. Then follow your healthcare provider's advice to treat your condition.  If a culture was done and it is positive:    Both you and your sexual partner may need to be treated. This is true even if your partner has no symptoms.    Contact your healthcare provider or go to an urgent care clinic or the public health department to be looked at and treated.    Don't have sex until both you and your partner(s) have finished all antibiotics and your healthcare provider says you are no longer contagious.    Learn about and use safe sex practices. The safest sex is with a partner who has tested negative and only has sex with you. Condoms can prevent STDs from spreading, but they aren't a guarantee.  Follow-up care  Follow up with your healthcare provider, or as advised. If a culture was taken, you may call as directed for the results. If you have an STD, follow up with your provider or the public health department for a complete STD screening, including HIV testing. For more information, contact CDC-INFO at 724-005-9810.  When to seek medical advice  Call your healthcare provider right away if any of these occur:    You aren't better after 3 days of treatment    Fever of 100.4 F (38 C) or higher, or as directed by your healthcare provider    Back or belly pain that gets worse    You can't urinate because of pain    New discharge from the urethra, vagina, or penis    Painful sores on the penis    Rash or joint pain    Painful lumps (lymph nodes) in the groin    Testicle pain or swelling of the scrotum  Date Last Reviewed: 11/1/2016 2000-2017 [employer]. [address] [place] [address], [place] [address]. All rights reserved. This information is not intended as a substitute for professional medical care. Always follow your healthcare professional's instructions.                Follow-ups after your visit        Your next 10 appointments already scheduled     Mar  "2018 11:00 AM CDT   Pre-Op physical with Jackie Gtz MD   [facility] ([facility])    [address]  [place] [address] [phone]              Who to contact     If you have questions or need follow up information about today's clinic visit or your schedule please contact [facility] directly at [phone].  Normal or non-critical lab and imaging results will be communicated to you by [employer], letter or phone within 4 business days after the clinic has received the results. If you do not hear from us within 7 days, please contact the clinic through [employer] or phone. If you have a critical or abnormal lab result, we will notify you by phone as soon as possible.  Submit refill requests through [email or web address] or call your pharmacy and they will forward the refill request to us. Please allow 3 business days for your refill to be completed.          Additional Information About Your Visit        [email or web address] Information     [email or web address] lets you send messages to your doctor, view your test results, renew your prescriptions, schedule appointments and more. To sign up, go to [email or web address] . Click on \"Log in\" on the left side of the screen, which will take you to the Welcome page. Then click on \"Sign up Now\" on the right side of the page.     You will be asked to enter the access code listed below, as well as some personal information. Please follow the directions to create your username and password.     Your access code is: [email or web address]  Expires: 2018  3:00 PM     Your access code will  in 90 days. If you need help or a new code, please call your [facility] clinic or [phone].        Care EveryWhere ID     This is your Care EveryWhere ID. This could be used by other organizations to access your [facility] medical records  [id number]        Your Vitals Were     Pulse Temperature BMI (Body Mass Index)             68 97.3  F " (36.3  C) 23.5 kg/m2          Blood Pressure from Last 3 Encounters:   03/15/18 102/82   01/04/18 120/68   12/05/17 120/82    Weight from Last 3 Encounters:   03/15/18 131 lb (59.4 kg)   01/04/18 135 lb (61.2 kg)   12/05/17 133 lb (60.3 kg)              We Performed the Following     UA reflex to Microscopic     Urine Culture Aerobic Bacterial     Urine Microscopic        Primary Care Provider Office Phone # Fax #    Jackie Marline Gtz [phone] [address] [facility] [address]        Equal Access to Services     [facility]: Hadii gurvinder Hanley, waliborioda herber, qaybta kaalmada anya, jose jacob . So [facility] [phone].    ATENCIÓN: Si habla español, tiene a swift disposición servicios gratuitos de asistencia lingüística. [facility] [phone].    We comply with applicable federal civil rights laws and [place] laws. We do not discriminate on the basis of race, color, national origin, age, disability, sex, sexual orientation, or gender identity.            Thank you!     Thank you for choosing [facility]  for your care. Our goal is always to provide you with excellent care. Hearing back from our patients is one way we can continue to improve our services. Please take a few minutes to complete the written survey that you may receive in the mail after your visit with us. Thank you!             Your Updated Medication List - Protect others around you: Learn how to safely use, store and throw away your medicines at www.disposemymeds.org.          This list is accurate as of 3/15/18  3:36 PM.  Always use your most recent med list.                   Brand Name Dispense Instructions for use Diagnosis    albuterol 108 (90 BASE) MCG/ACT Inhaler    PROAIR HFA/PROVENTIL HFA/VENTOLIN HFA     Inhale 2 puffs into the lungs every 6 hours as needed for shortness of breath / dyspnea        doxycycline monohydrate 50 MG capsule       Take 1 capsule by mouth each time if needed for Other (Specify).        estrogens (conjugated) 0.625 MG tablet    PREMARIN     Take 1 tablet by mouth every other day        metroNIDAZOLE 0.75 % cream    METROCREAM          MULTI-VITAMINS Tabs      Take 1 tablet by mouth daily        naproxen 500 MG tablet    NAPROSYN     Take 1 tablet by mouth daily as needed

## 2018-03-15 NOTE — PATIENT INSTRUCTIONS
Dysuria with Uncertain Cause (Adult)    The urethra is the tube that allows urine to pass out of the body. In a woman, the urethra is the opening above the vagina. In men, the urethra is the opening on the tip of the penis. Dysuria is the feeling of pain or burning in the urethra when passing urine.  Dysuria can be caused by anything that irritates or inflames the urethra. An infection or chemical irritation can cause this reaction. A bladder infection is the most common cause of dysuria in adults. A urine test can diagnose this. A bladder infection needs antibiotic treatment.  Soaps, lotions, colognes and feminine hygiene products can cause dysuria. So can birth control jellies, creams, and foams. It will go away 1 to 3 days after using these irritants.  Sexually transmitted diseases (STDs) such as chlamydia or gonorrhea can cause dysuria. Your healthcare provider may take a culture sample. Your provider may start you on antibiotic medicine before the culture test returns.  In women who have gone through menopause, dysuria can be from dryness in the lining of the urethra. This can be treated with hormones. Dysuria becomes long-term (chronic) when it lasts for weeks or months. You may need to see a specialist (urologist) to diagnose and treat chronic dysuria.  Home care  These home care tips may help:    Don't use any chemicals or products that you think may be causing your symptoms.    If you were given a prescription medicine, take as directed. Be sure to take it until it is all used up.    If a culture was taken, don't have sex until you have been told that it is negative. This means you don't have an infection. Then follow your healthcare provider's advice to treat your condition.  If a culture was done and it is positive:    Both you and your sexual partner may need to be treated. This is true even if your partner has no symptoms.    Contact your healthcare provider or go to an urgent care clinic or the  [facility] health department to be looked at and treated.    Don't have sex until both you and your partner(s) have finished all antibiotics and your healthcare provider says you are no longer contagious.    Learn about and use safe sex practices. The safest sex is with a partner who has tested negative and only has sex with you. Condoms can prevent STDs from spreading, but they aren't a guarantee.  Follow-up care  Follow up with your healthcare provider, or as advised. If a culture was taken, you may call as directed for the results. If you have an STD, follow up with your provider or the public health department for a complete STD screening, including HIV testing. For more information, contact CDC-INFO at 898-955-1331.  When to seek medical advice  Call your healthcare provider right away if any of these occur:    You aren't better after 3 days of treatment    Fever of 100.4 F (38 C) or higher, or as directed by your healthcare provider    Back or belly pain that gets worse    You can't urinate because of pain    New discharge from the urethra, vagina, or penis    Painful sores on the penis    Rash or joint pain    Painful lumps (lymph nodes) in the groin    Testicle pain or swelling of the scrotum  Date Last Reviewed: 11/1/2016 2000-2017 [employer]. [address] [place] [address], [place] [address]. All rights reserved. This information is not intended as a substitute for professional medical care. Always follow your healthcare professional's instructions.

## 2018-03-15 NOTE — NURSING NOTE
"Patient presents to clinic with c/o \"swollen bladder.\" states some urgency and frequency.  Tori Kathleen LPN ...... 3/15/2018 2:43 PM      "

## 2018-03-15 NOTE — PROGRESS NOTES
SUBJECTIVE: Carolyne Medley is a 77 year old female who complains of 2-3 days of questionable bladder symptoms and has a cystocele so that has felt uncomfortable. No dysuria or frequency. She is up at night one time. Tries to drink lots of water.     OBJECTIVE: Appears well, in no apparent distress.  Vital signs are normal. The abdomen is soft without tenderness, guarding, mass, rebound or organomegaly. No CVA tenderness or inguinal adenopathy noted.   Results for orders placed or performed in visit on 03/15/18   UA reflex to Microscopic   Result Value Ref Range    Color Urine Yellow     Appearance Urine Clear     Glucose Urine Negative NEG^Negative mg/dL    Bilirubin Urine Negative NEG^Negative    Ketones Urine Negative NEG^Negative mg/dL    Specific Gravity Urine <1.005 1.003 - 1.035    Blood Urine Negative NEG^Negative    pH Urine 6.0 5.0 - 7.0 pH    Protein Albumin Urine Negative NEG^Negative mg/dL    Urobilinogen Urine 0.2 0.2 - 1.0 EU/dL    Nitrite Urine Negative NEG^Negative    Leukocyte Esterase Urine Large (A) NEG^Negative    Source Midstream Urine    Urine Microscopic   Result Value Ref Range    WBC Urine 10-25 (A) OTO5^0 - 5 /HPF    RBC Urine O - 2 OTO2^O - 2 /HPF    Bacteria Urine Few (A) NEG^Negative /HPF     I have personally reviewed the labslisted above.  Urine culture pending.     ASSESSMENT:   1. UTI symptoms          PLAN:   Fluids and OTC Pyridium as needed until culture obtained.  She has a preoperative exam on Monday for cataract surgery and we will follow-up then.  Jackie Gtz MD  3:35 PM 3/15/2018   Portions of this dictation were created using the Dragon Nuance voice recognition system. Proofreading was completed but there may be errors in text.

## 2018-03-17 LAB
BACTERIA SPEC CULT: NORMAL
SPECIMEN SOURCE: NORMAL

## 2018-03-20 ENCOUNTER — OFFICE VISIT (OUTPATIENT)
Dept: FAMILY MEDICINE | Facility: OTHER | Age: 78
End: 2018-03-20
Attending: FAMILY MEDICINE
Payer: MEDICARE

## 2018-03-20 VITALS
WEIGHT: 131 LBS | HEART RATE: 62 BPM | BODY MASS INDEX: 23.21 KG/M2 | HEIGHT: 63 IN | SYSTOLIC BLOOD PRESSURE: 110 MMHG | DIASTOLIC BLOOD PRESSURE: 78 MMHG

## 2018-03-20 DIAGNOSIS — H90.3 BILATERAL SENSORINEURAL HEARING LOSS: ICD-10-CM

## 2018-03-20 DIAGNOSIS — Z01.818 PREOP GENERAL PHYSICAL EXAM: Primary | ICD-10-CM

## 2018-03-20 DIAGNOSIS — H25.13 AGE-RELATED NUCLEAR CATARACT, BILATERAL: ICD-10-CM

## 2018-03-20 PROCEDURE — 99214 OFFICE O/P EST MOD 30 MIN: CPT | Performed by: FAMILY MEDICINE

## 2018-03-20 PROCEDURE — G0463 HOSPITAL OUTPT CLINIC VISIT: HCPCS

## 2018-03-20 NOTE — NURSING NOTE
This patient presents today for a Preperative exam for this procedure:  Right eye cataract  Date of Surgery:  3/28/18  Surgeon:  Dr. Myles  Facility:  [facility]  Fax:  [phone]  Tori Kathleen LPN ...... 3/20/2018 2:10 PM

## 2018-03-20 NOTE — MR AVS SNAPSHOT
After Visit Summary   3/20/2018    Carolyne Medley    MRN: [id number]           Patient Information     Date Of Birth          1940        Visit Information        Provider Department      3/20/2018 2:00 PM Jackie Gtz MD [facility]        Today's Diagnoses     Preop general physical exam    -  1    Age-related nuclear cataract, bilateral        Bilateral sensorineural hearing loss          Care Instructions      Before Your Surgery      Call your surgeon if there is any change in your health. This includes signs of a cold or flu (such as a sore throat, runny nose, cough, rash or fever).    Do not smoke, drink alcohol or take over the counter medicine (unless your surgeon or primary care doctor tells you to) for the 24 hours before and after surgery.    If you take prescribed drugs: Follow your doctor s orders about which medicines to take and which to stop until after surgery.    Eating and drinking prior to surgery: follow the instructions from your surgeon    Take a shower or bath the night before surgery. Use the soap your surgeon gave you to gently clean your skin. If you do not have soap from your surgeon, use your regular soap. Do not shave or scrub the surgery site.  Wear clean pajamas and have clean sheets on your bed.           Follow-ups after your visit        Who to contact     If you have questions or need follow up information about today's clinic visit or your schedule please contact [facility] directly at [phone].  Normal or non-critical lab and imaging results will be communicated to you by MyChart, letter or phone within 4 business days after the clinic has received the results. If you do not hear from us within 7 days, please contact the clinic through Ref[employer] or phone. If you have a critical or abnormal lab result, we will notify you by phone as soon as possible.  Submit refill requests through [employer] or call your  "pharmacy and they will forward the refill request to us. Please allow 3 business days for your refill to be completed.          Additional Information About Your Visit        Care EveryWhere ID     This is your Care EveryWhere ID. This could be used by other organizations to access your [facility] medical records  [id number]        Your Vitals Were     Pulse Height BMI (Body Mass Index)             62 5' 2.5\" (1.588 m) 23.58 kg/m2          Blood Pressure from Last 3 Encounters:   03/20/18 110/78   03/15/18 102/82   01/04/18 120/68    Weight from Last 3 Encounters:   03/20/18 131 lb (59.4 kg)   03/15/18 131 lb (59.4 kg)   01/04/18 135 lb (61.2 kg)              Today, you had the following     No orders found for display       Primary Care Provider Office Phone # Fax #    Jackie Marline Gtz [phone]       [address] [facility] [address]        Equal Access to Services     [facility]: Hadii aad ku hadasho Soomaali, waaxda luqadaha, qaybta kaalmada adeegyada, waxay denizin haytiarra jacob . So [facility] [phone].    ATENCIÓN: Si habla español, tiene a swift disposición servicios gratuitos de asistencia lingüística. [facility] [phone].    We comply with applicable federal civil rights laws and [place] laws. We do not discriminate on the basis of race, color, national origin, age, disability, sex, sexual orientation, or gender identity.            Thank you!     Thank you for choosing [facility] AND [facility]  for your care. Our goal is always to provide you with excellent care. Hearing back from our patients is one way we can continue to improve our services. Please take a few minutes to complete the written survey that you may receive in the mail after your visit with us. Thank you!             Your Updated Medication List - Protect others around you: Learn how to safely use, store and throw away your medicines at www.disposemymeds.org.          This list is " accurate as of 3/20/18  2:32 PM.  Always use your most recent med list.                   Brand Name Dispense Instructions for use Diagnosis    albuterol 108 (90 BASE) MCG/ACT Inhaler    PROAIR HFA/PROVENTIL HFA/VENTOLIN HFA     Inhale 2 puffs into the lungs every 6 hours as needed for shortness of breath / dyspnea        doxycycline monohydrate 50 MG capsule      Take 1 capsule by mouth each time if needed for Other (Specify).        estrogens (conjugated) 0.625 MG tablet    PREMARIN     Take 1 tablet by mouth every other day        metroNIDAZOLE 0.75 % cream    METROCREAM          MULTI-VITAMINS Tabs      Take 1 tablet by mouth daily        naproxen 500 MG tablet    NAPROSYN     Take 1 tablet by mouth daily as needed

## 2018-03-20 NOTE — PROGRESS NOTES
[facility]  [address]  [place] [address]  [phone]    PRE-OP EVALUATION:  Today's date: 3/20/2018    Carolyne Medley (: 1940) presents for pre-operative evaluation assessment as requested by Dr. Myles.  She requires evaluation and anesthesia risk assessment prior to undergoing surgery/procedure for treatment of NO significant chronic diseases.     Nursing Notes:   Isabell Kathleen LPN  3/20/2018  2:25 PM  Signed  This patient presents today for a Preperative exam for this procedure:  Right eye cataract  Date of Surgery:  3/28/18  Surgeon:  Dr. Myles  [facility]  Fax:  [phone]  Tori Hever EASLEY ...... 3/20/2018 2:10 PM      Patient has a Health Care Directive or Living Will:  YES but plans to update    1. NO - Do you have a history of heart attack, stroke, stent, bypass or surgery on an artery in the head, neck, heart or legs?  2. NO - Do you ever have any pain or discomfort in your chest?  3. NO - Do you have a history of  Heart Failure?  4. NO - Are you troubled by shortness of breath when: walking on the level, up a slight hill or at night?  5. NO - Do you currently have a cold, bronchitis or other respiratory infection?  6. NO - Do you have a cough, shortness of breath or wheezing?  7. NO - Do you sometimes get pains in the calves of your legs when you walk?  8. NO - Do you or anyone in your family have previous history of blood clots?  9. NO - Do you or does anyone in your family have a serious bleeding problem such as prolonged bleeding following surgeries or cuts?  10. NO - Have you ever had problems with anemia or been told to take iron pills?  11. NO - Have you had any abnormal blood loss such as black, tarry or bloody stools, or abnormal vaginal bleeding?  12. NO - Have you ever had a blood transfusion?  13. NO - Have you or any of your relatives ever had problems with anesthesia?  14. NO - Do you have sleep  apnea, excessive snoring or daytime drowsiness?  15. NO - Do you have any prosthetic heart valves?  16. NO - Do you have prosthetic joints?  17. NO - Is there any chance that you may be pregnant?      HPI:     HPI related to upcoming procedure: worsening vision due to cataracts.       See problem list for active medical problems.  Problems all longstanding and stable, except as noted/documented.  See ROS for pertinent symptoms related to these conditions.                                                                                                  .    MEDICAL HISTORY:     Patient Active Problem List    Diagnosis Date Noted     Bilateral sensorineural hearing loss 2018     Priority: Medium     Wears hearing aids.       Acne rosacea 2018     Priority: Medium     Backache 2018     Priority: Medium     Pseudoexfoliation open-angle glaucoma, bilateral 2017     Priority: Medium     Age-related nuclear cataract, bilateral 2017     Priority: Medium     Female cystocele 2017     Priority: Medium     Borderline osteopenia 2016     Priority: Medium     Overview:   DEXA 2016        Past Medical History:   Diagnosis Date     Personal history of diseases of skin or subcutaneous tissue     No Comments Provided     Personal history of other medical treatment (CODE)          Past Surgical History:   Procedure Laterality Date     HYSTERECTOMY TOTAL ABDOMINAL, BILATERAL SALPINGO-OOPHORECTOMY, [place]ED      [place] With bladder suspension     OTHER SURGICAL HISTORY      2010,VFV034,CYSTOCELE REPAIR,[place]     Current Outpatient Prescriptions   Medication Sig Dispense Refill     albuterol (PROAIR HFA/PROVENTIL HFA/VENTOLIN HFA) 108 (90 BASE) MCG/ACT Inhaler Inhale 2 puffs into the lungs every 6 hours as needed for shortness of breath / dyspnea       doxycycline monohydrate 50 MG capsule Take 1 capsule by mouth each time if needed for Other (Specify).        "estrogens, conjugated, (PREMARIN) 0.625 MG tablet Take 1 tablet by mouth every other day       metroNIDAZOLE (METROCREAM) 0.75 % cream        Multiple Vitamin (MULTI-VITAMINS) TABS Take 1 tablet by mouth daily       naproxen (NAPROSYN) 500 MG tablet Take 1 tablet by mouth daily as needed       OTC products: no recent use of OTC ASA, NSAIDS or Steroids    Allergies   Allergen Reactions     Food      Fruits and vegetables      Latex Rash     Nickel Rash     Rash,runny nose, throat issue     Penicillins Rash      Latex Allergy: NO    Social History   Substance Use Topics     Smoking status: Former Smoker     Quit date: 1/13/1964     Smokeless tobacco: Never Used     Alcohol use 0.5 oz/week      Comment: Alcoholic Drinks/day: occasional     History   Drug Use Not on file     Comment: Drug use: No       REVIEW OF SYSTEMS:   CONSTITUTIONAL: NEGATIVE for fever, chills, change in weight  ENT/MOUTH: NEGATIVE for ear, mouth and throat problems  RESP: NEGATIVE for significant cough or SOB  CV: NEGATIVE for chest pain, palpitations or peripheral edema    EXAM:   /78 (BP Location: Right arm, Patient Position: Sitting, Cuff Size: Adult Regular)  Pulse 62  Ht 5' 2.5\" (1.588 m)  Wt 131 lb (59.4 kg)  BMI 23.58 kg/m2  GENERAL APPEARANCE: healthy, alert and no distress  HENT: ear canals and TM's normal and nose and mouth without ulcers or lesions  RESP: lungs clear to auscultation - no rales, rhonchi or wheezes  CV: regular rate and rhythm, normal S1 S2, no S3 or S4 and no murmur, click or rub   ABDOMEN: soft, nontender, no HSM or masses and bowel sounds normal  NEURO: Normal strength and tone, sensory exam grossly normal, mentation intact and speech normal    DIAGNOSTICS:   No labs or EKG required for low risk surgery (cataract, skin procedure, breast biopsy, etc)    IMPRESSION:     The proposed surgical procedure is considered LOW risk.    REVISED CARDIAC RISK INDEX  The patient has the following serious cardiovascular " risks for perioperative complications such as (MI, PE, VFib and 3  AV Block):  No serious cardiac risks  INTERPRETATION: 0 risks: Class I (very low risk - 0.4% complication rate)    The patient has the following additional risks for perioperative complications:  No identified additional risks      ICD-10-CM    1. Preop general physical exam Z01.818    2. Age-related nuclear cataract, bilateral H25.13    3. Bilateral sensorineural hearing loss H90.3        RECOMMENDATIONS:       APPROVAL GIVEN to proceed with proposed procedure, without further diagnostic evaluation  No labs or EKG needed today.     Signed Electronically by: Jackie Gtz MD    Copy of this evaluation report is provided to requesting physician.    Portions of this dictation were created using the Dragon Nuance voice recognition system. Proofreading was completed but there may be errors in text.

## 2018-03-23 ENCOUNTER — OFFICE VISIT (OUTPATIENT)
Dept: FAMILY MEDICINE | Facility: OTHER | Age: 78
End: 2018-03-23
Attending: NURSE PRACTITIONER
Payer: MEDICARE

## 2018-03-23 VITALS
BODY MASS INDEX: 24.01 KG/M2 | RESPIRATION RATE: 16 BRPM | HEART RATE: 82 BPM | TEMPERATURE: 98.2 F | WEIGHT: 130.5 LBS | SYSTOLIC BLOOD PRESSURE: 110 MMHG | DIASTOLIC BLOOD PRESSURE: 78 MMHG | HEIGHT: 62 IN

## 2018-03-23 DIAGNOSIS — N30.00 ACUTE CYSTITIS WITHOUT HEMATURIA: Primary | ICD-10-CM

## 2018-03-23 DIAGNOSIS — R39.89 URINARY PROBLEM: ICD-10-CM

## 2018-03-23 LAB
ALBUMIN UR-MCNC: 100 MG/DL
APPEARANCE UR: CLEAR
BACTERIA #/AREA URNS HPF: ABNORMAL /HPF
BILIRUB UR QL STRIP: NEGATIVE
COLOR UR AUTO: YELLOW
GLUCOSE UR STRIP-MCNC: NEGATIVE MG/DL
HGB UR QL STRIP: ABNORMAL
KETONES UR STRIP-MCNC: NEGATIVE MG/DL
LEUKOCYTE ESTERASE UR QL STRIP: ABNORMAL
NITRATE UR QL: NEGATIVE
PH UR STRIP: 6 PH (ref 5–7)
RBC #/AREA URNS AUTO: ABNORMAL /HPF
SOURCE: ABNORMAL
SP GR UR STRIP: 1.02 (ref 1–1.03)
UROBILINOGEN UR STRIP-ACNC: 0.2 EU/DL (ref 0.2–1)
WBC #/AREA URNS AUTO: >100 /HPF

## 2018-03-23 PROCEDURE — 87088 URINE BACTERIA CULTURE: CPT | Performed by: NURSE PRACTITIONER

## 2018-03-23 PROCEDURE — 87086 URINE CULTURE/COLONY COUNT: CPT | Performed by: NURSE PRACTITIONER

## 2018-03-23 PROCEDURE — 99214 OFFICE O/P EST MOD 30 MIN: CPT | Performed by: NURSE PRACTITIONER

## 2018-03-23 PROCEDURE — 81001 URINALYSIS AUTO W/SCOPE: CPT | Performed by: NURSE PRACTITIONER

## 2018-03-23 RX ORDER — CIPROFLOXACIN 500 MG/1
500 TABLET, FILM COATED ORAL 2 TIMES DAILY
Qty: 14 TABLET | Refills: 0 | Status: SHIPPED | OUTPATIENT
Start: 2018-03-23 | End: 2018-09-17

## 2018-03-23 ASSESSMENT — PAIN SCALES - GENERAL: PAINLEVEL: EXTREME PAIN (8)

## 2018-03-23 NOTE — MR AVS SNAPSHOT
"              After Visit Summary   3/23/2018    Carolyne Medley    MRN: [id number]           Patient Information     Date Of Birth          1940        Visit Information        Provider Department      3/23/2018 12:30 PM Sherrie Rodriguez APRN CNP [facility]        Today's Diagnoses     Acute cystitis without hematuria    -  1    Urinary problem          Care Instructions    Antibiotics per order.  Drink plenty of fluids.   Return to clinic if any fevers, vomiting, or flank pain develops as this may be a sign the infection has moved to your kidney's.  We have initiated a urine culture. If any changes are needed in your antibiotics, we will notify you when the results have returned.               Follow-ups after your visit        Who to contact     If you have questions or need follow up information about today's clinic visit or your schedule please contact [facility] directly at [phone].  Normal or non-critical lab and imaging results will be communicated to you by MyChart, letter or phone within 4 business days after the clinic has received the results. If you do not hear from us within 7 days, please contact the clinic through MyChart or phone. If you have a critical or abnormal lab result, we will notify you by phone as soon as possible.  Submit refill requests through ERN or call your pharmacy and they will forward the refill request to us. Please allow 3 business days for your refill to be completed.          Additional Information About Your Visit        Care EveryWhere ID     This is your Care EveryWhere ID. This could be used by other organizations to access your [facility] medical records  [id number]        Your Vitals Were     Pulse Temperature Respirations Height Breastfeeding? BMI (Body Mass Index)    82 98.2  F (36.8  C) (Oral) 16 5' 1.5\" (1.562 m) No 24.26 kg/m2       Blood Pressure from Last 3 Encounters:   03/23/18 110/78   03/20/18 " 110/78   03/15/18 102/82    Weight from Last 3 Encounters:   03/23/18 130 lb 8 oz (59.2 kg)   03/20/18 131 lb (59.4 kg)   03/15/18 131 lb (59.4 kg)              We Performed the Following     *UA reflex to Microscopic     Urine Culture Aerobic Bacterial     Urine Microscopic          Today's Medication Changes          These changes are accurate as of 3/23/18  1:12 PM.  If you have any questions, ask your nurse or doctor.               Start taking these medicines.        Dose/Directions    ciprofloxacin 500 MG tablet   Commonly known as:  CIPRO   Used for:  Acute cystitis without hematuria   Started by:  Sherrie Rodriguez APRN CNP        Dose:  500 mg   Take 1 tablet (500 mg) by mouth 2 times daily   Quantity:  14 tablet   Refills:  0            Where to get your medicines      These medications were sent to [facility], - [place] - [address]  [address], [place] [address]     Phone:  [phone]     ciprofloxacin 500 MG tablet                Primary Care Provider Office Phone # Fax #    Jackie Marline Gtz [phone]       [address]  [place] [address]        Equal Access to Services     [facility]: Hadii gurvinder goff Solucia, waaxda luqadaha, qaybta kaalmada adenoyada, jose sebastian. So [facility] [place] [phone].    ATENCIÓN: Si habla español, tiene a swift disposición servicios gratuitos de asistencia lingüística. Ll[facility] [phone].    We comply with applicable federal civil rights laws and [place] laws. We do not discriminate on the basis of race, color, national origin, age, disability, sex, sexual orientation, or gender identity.            Thank you!     Thank you for choosing [facility]  for your care. Our goal is always to provide you with excellent care. Hearing back from our patients is one way we can continue to improve our services. Please take a few minutes to  complete the written survey that you may receive in the mail after your visit with us. Thank you!             Your Updated Medication List - Protect others around you: Learn how to safely use, store and throw away your medicines at www.disposemymeds.org.          This list is accurate as of 3/23/18  1:12 PM.  Always use your most recent med list.                   Brand Name Dispense Instructions for use Diagnosis    albuterol 108 (90 BASE) MCG/ACT Inhaler    PROAIR HFA/PROVENTIL HFA/VENTOLIN HFA     Inhale 2 puffs into the lungs every 6 hours as needed for shortness of breath / dyspnea        ciprofloxacin 500 MG tablet    CIPRO    14 tablet    Take 1 tablet (500 mg) by mouth 2 times daily    Acute cystitis without hematuria       doxycycline monohydrate 50 MG capsule      Take 1 capsule by mouth each time if needed for Other (Specify).        estrogens (conjugated) 0.625 MG tablet    PREMARIN     Take 1 tablet by mouth every other day        metroNIDAZOLE 0.75 % cream    METROCREAM          MULTI-VITAMINS Tabs      Take 1 tablet by mouth daily        naproxen 500 MG tablet    NAPROSYN     Take 1 tablet by mouth daily as needed

## 2018-03-23 NOTE — NURSING NOTE
Patient present to clinic today with burning, pain, urgency, cloudy urine. This has been ongoing for 1 week.  Nataliia Ballard CMA..............3/23/2018........12:54 PM

## 2018-03-23 NOTE — PROGRESS NOTES
HPI:    Carolyne Medley is a 77 year old female who presents to clinic today for urinary concerns. She has had sx for about 1 week. Was seen by Dr Gtz a week ago, UA was not conclusive. Over the past few days has had cloudy urine, having urgency to void as well as burning with urination. No fevers. No abdominal or back pain. Denies any n/v. She has had UTI's in the past, not recurrent. She has eye surgery next week and worried it would get cancelled if not treated.     Past Medical History:   Diagnosis Date     Personal history of diseases of skin or subcutaneous tissue     No Comments Provided     Personal history of other medical treatment (CODE)            Past Surgical History:   Procedure Laterality Date     HYSTERECTOMY TOTAL ABDOMINAL, BILATERAL SALPINGO-OOPHORECTOMY, COMBINED      [place] With bladder suspension     OTHER SURGICAL HISTORY      2010,[id number],CYSTOCELE REPAIR,[place]       Family History   Problem Relation Age of Onset     OSTEOPOROSIS Mother      Osteoporosis     Other - See Comments Mother      Dementia     Breast Cancer No family hx of      Cancer-breast       Social History     Social History     Marital status:      Spouse name: N/A     Number of children: N/A     Years of education: N/A     Occupational History     Not on file.     Social History Main Topics     Smoking status: Former Smoker     Quit date: 1964     Smokeless tobacco: Never Used     Alcohol use 0.5 oz/week      Comment: Alcoholic Drinks/day: occasional     Drug use: Not on file      Comment: Drug use: No     Sexual activity: No     Other Topics Concern     Not on file     Social History Narrative    , [place].   Has one daughter and lives in the [place] area.       Current Outpatient Prescriptions   Medication Sig Dispense Refill     ciprofloxacin (CIPRO) 500 MG tablet Take 1 tablet (500 mg) by mouth 2 times daily 14 tablet 0     albuterol (PROAIR HFA/PROVENTIL  HFA/VENTOLIN HFA) 108 (90 BASE) MCG/ACT Inhaler Inhale 2 puffs into the lungs every 6 hours as needed for shortness of breath / dyspnea       estrogens, conjugated, (PREMARIN) 0.625 MG tablet Take 1 tablet by mouth every other day       metroNIDAZOLE (METROCREAM) 0.75 % cream        Multiple Vitamin (MULTI-VITAMINS) TABS Take 1 tablet by mouth daily       naproxen (NAPROSYN) 500 MG tablet Take 1 tablet by mouth daily as needed       doxycycline monohydrate 50 MG capsule Take 1 capsule by mouth each time if needed for Other (Specify).         Allergies   Allergen Reactions     Food      Fruits and vegetables      Latex Rash     Nickel Rash     Rash,runny nose, throat issue     Penicillins Rash       ROS:  Pertinent positives and negatives are noted in HPI.    EXAM:  General appearance: well appearing female in no acute distress  Respiratory: clear to auscultation bilaterally  Cardiac: RRR with no murmurs  Abdomen: soft, suprapubic tenderness, no masses or organomegally, no CVAT  Psychological: normal affect, alert and pleasant  Lab:   Results for orders placed or performed in visit on 03/23/18   *UA reflex to Microscopic   Result Value Ref Range    Color Urine Yellow     Appearance Urine Clear     Glucose Urine Negative NEG^Negative mg/dL    Bilirubin Urine Negative NEG^Negative    Ketones Urine Negative NEG^Negative mg/dL    Specific Gravity Urine 1.025 1.003 - 1.035    Blood Urine Large (A) NEG^Negative    pH Urine 6.0 5.0 - 7.0 pH    Protein Albumin Urine 100 (A) NEG^Negative mg/dL    Urobilinogen Urine 0.2 0.2 - 1.0 EU/dL    Nitrite Urine Negative NEG^Negative    Leukocyte Esterase Urine Moderate (A) NEG^Negative    Source Midstream Urine    Urine Microscopic   Result Value Ref Range    WBC Urine >100 (A) OTO5^0 - 5 /HPF    RBC Urine O - 2 OTO2^O - 2 /HPF    Bacteria Urine Few (A) NEG^Negative /HPF       ASSESSMENT AND PLAN:    1. Acute cystitis without hematuria    2. Urinary problem      UA with large blood and  moderate leukocytes. Having >100 WBC and fe bacteria. Sx consistent with UTI. Tx empirically with cipro pending UC. She would like guarantee that she is treated appropriately and surgery would not get cancelled. I explained how the UC works and I could not guarantee this but based o previous UC, cipro should work appropriately. Will f/u with UC on Monday if any changes are needed. She does ask me to call her PCP today to make sure I am treating her appropriately but her PCP is not available. She requests this note be sent to her for review on Monday. I will do this. Reviewed need to complete all antibiotics. Discussed typical course of illness, symptomatic treatment and when to return to clinic. Patient in agreement with plan and all questions were answered.         Sherrie Rodriguez..................3/23/2018 1:04 PM

## 2018-03-23 NOTE — PATIENT INSTRUCTIONS
Antibiotics per order.  Drink plenty of fluids.   Return to clinic if any fevers, vomiting, or flank pain develops as this may be a sign the infection has moved to your kidney's.  We have initiated a urine culture. If any changes are needed in your antibiotics, we will notify you when the results have returned.

## 2018-03-23 NOTE — Clinical Note
FYI, patient asked for my note to be sent to you for review. TG Arteaga CNP on 3/23/2018 at 1:54 PM

## 2018-03-24 ENCOUNTER — TELEPHONE (OUTPATIENT)
Dept: FAMILY MEDICINE | Facility: OTHER | Age: 78
End: 2018-03-24

## 2018-03-24 LAB
BACTERIA SPEC CULT: ABNORMAL
SPECIMEN SOURCE: ABNORMAL

## 2018-03-28 ENCOUNTER — TRANSFERRED RECORDS (OUTPATIENT)
Dept: HEALTH INFORMATION MANAGEMENT | Facility: OTHER | Age: 78
End: 2018-03-28

## 2018-04-25 ENCOUNTER — TRANSFERRED RECORDS (OUTPATIENT)
Dept: HEALTH INFORMATION MANAGEMENT | Facility: OTHER | Age: 78
End: 2018-04-25

## 2018-09-17 ENCOUNTER — OFFICE VISIT (OUTPATIENT)
Dept: FAMILY MEDICINE | Facility: OTHER | Age: 78
End: 2018-09-17
Payer: MEDICARE

## 2018-09-17 VITALS
SYSTOLIC BLOOD PRESSURE: 110 MMHG | HEIGHT: 62 IN | HEART RATE: 72 BPM | BODY MASS INDEX: 24.51 KG/M2 | DIASTOLIC BLOOD PRESSURE: 64 MMHG | TEMPERATURE: 97.6 F | WEIGHT: 133.19 LBS

## 2018-09-17 DIAGNOSIS — N30.00 ACUTE CYSTITIS WITHOUT HEMATURIA: ICD-10-CM

## 2018-09-17 DIAGNOSIS — R39.89 URINARY PROBLEM: Primary | ICD-10-CM

## 2018-09-17 LAB
ALBUMIN UR-MCNC: ABNORMAL MG/DL
APPEARANCE UR: CLEAR
BACTERIA #/AREA URNS HPF: ABNORMAL /HPF
BILIRUB UR QL STRIP: NEGATIVE
COLOR UR AUTO: YELLOW
GLUCOSE UR STRIP-MCNC: NEGATIVE MG/DL
HGB UR QL STRIP: ABNORMAL
KETONES UR STRIP-MCNC: NEGATIVE MG/DL
LEUKOCYTE ESTERASE UR QL STRIP: ABNORMAL
NITRATE UR QL: NEGATIVE
PH UR STRIP: 6 PH (ref 5–9)
RBC #/AREA URNS AUTO: ABNORMAL /HPF
SOURCE: ABNORMAL
SP GR UR STRIP: 1.02 (ref 1–1.03)
UROBILINOGEN UR STRIP-ACNC: 0.2 EU/DL (ref 0.2–1)
WBC #/AREA URNS AUTO: >100 /HPF

## 2018-09-17 PROCEDURE — 81001 URINALYSIS AUTO W/SCOPE: CPT | Performed by: FAMILY MEDICINE

## 2018-09-17 PROCEDURE — G0463 HOSPITAL OUTPT CLINIC VISIT: HCPCS

## 2018-09-17 PROCEDURE — 87088 URINE BACTERIA CULTURE: CPT | Performed by: FAMILY MEDICINE

## 2018-09-17 PROCEDURE — 87086 URINE CULTURE/COLONY COUNT: CPT | Performed by: FAMILY MEDICINE

## 2018-09-17 PROCEDURE — 99213 OFFICE O/P EST LOW 20 MIN: CPT | Performed by: FAMILY MEDICINE

## 2018-09-17 RX ORDER — CIPROFLOXACIN 500 MG/1
500 TABLET, FILM COATED ORAL 2 TIMES DAILY
Qty: 14 TABLET | Refills: 0 | Status: SHIPPED | OUTPATIENT
Start: 2018-09-17 | End: 2018-09-24

## 2018-09-17 NOTE — MR AVS SNAPSHOT
"              After Visit Summary   2018    Carolyne Medley    MRN: [id number]           Patient Information     Date Of Birth          1940        Visit Information        Provider Department      2018 4:30 PM Mavis Ashley DO [facility]        Today's Diagnoses     Urinary problem    -  1    Acute cystitis without hematuria           Follow-ups after your visit        Who to contact     If you have questions or need follow up information about today's clinic visit or your schedule please contact [facility] directly at [phone].  Normal or non-critical lab and imaging results will be communicated to you by Bridgehart, letter or phone within 4 business days after the clinic has received the results. If you do not hear from us within 7 days, please contact the clinic through Entangled Mediat or phone. If you have a critical or abnormal lab result, we will notify you by phone as soon as possible.  Submit refill requests through Zipzoom or call your pharmacy and they will forward the refill request to us. Please allow 3 business days for your refill to be completed.          Additional Information About Your Visit        MyChart Information     Zipzoom lets you send messages to your doctor, view your test results, renew your prescriptions, schedule appointments and more. To sign up, go to [email or web address] . Click on \"Log in\" on the left side of the screen, which will take you to the Welcome page. Then click on \"Sign up Now\" on the right side of the page.     You will be asked to enter the access code listed below, as well as some personal information. Please follow the directions to create your username and password.     Your access code is: [email or web address]  Expires: 2018  6:34 AM     Your access code will  in 90 days. If you need help or a new code, please call your [place] clinic or [phone].        Care EveryWhere ID     This is your Care " "EveryWhere ID. This could be used by other organizations to access your [place] medical records  [id number]        Your Vitals Were     Pulse Temperature Height BMI (Body Mass Index)          72 97.6  F (36.4  C) (Tympanic) 5' 1.5\" (1.562 m) 24.76 kg/m2         Blood Pressure from Last 3 Encounters:   09/17/18 110/64   03/23/18 110/78   03/20/18 110/78    Weight from Last 3 Encounters:   09/17/18 133 lb 3 oz (60.4 kg)   03/23/18 130 lb 8 oz (59.2 kg)   03/20/18 131 lb (59.4 kg)              We Performed the Following     Urinalysis w Reflex Microscopic If Positive     Urine Culture Aerobic Bacterial     Urine Microscopic          Where to get your medicines      These medications were sent to [facility] [address] [address]     Phone:  [phone]     ciprofloxacin 500 MG tablet          Primary Care Provider Office Phone # Fax #    Jackie Marline Gtz [phone]       [address] [facility] [address]        Equal Access to Services     [facility] : Hadii gurvinder mercadoo Solucia, waaxda luqadaha, qaybta kaalmada adenoyada, jose sebastian. So [facility] [phone].    ATENCIÓN: Si habla español, tiene a swift disposición servicios gratuitos de asistencia lingüística. [facility] [phone].    We comply with applicable federal civil rights laws and [place] laws. We do not discriminate on the basis of race, color, national origin, age, disability, sex, sexual orientation, or gender identity.            Thank you!     Thank you for choosing [facility] AND [facility]  for your care. Our goal is always to provide you with excellent care. Hearing back from our patients is one way we can continue to improve our services. Please take a few minutes to complete the written survey that you may receive in the mail after your visit with us. Thank you!             Your Updated Medication " List - Protect others around you: Learn how to safely use, store and throw away your medicines at www.disposemymeds.org.          This list is accurate as of 9/17/18 11:59 PM.  Always use your most recent med list.                   Brand Name Dispense Instructions for use Diagnosis    albuterol 108 (90 Base) MCG/ACT inhaler    PROAIR HFA/PROVENTIL HFA/VENTOLIN HFA     Inhale 2 puffs into the lungs every 6 hours as needed for shortness of breath / dyspnea        ciprofloxacin 500 MG tablet    CIPRO    14 tablet    Take 1 tablet (500 mg) by mouth 2 times daily for 7 days    Acute cystitis without hematuria       estrogens (conjugated) 0.625 MG tablet    PREMARIN     Take 1 tablet by mouth every other day        metroNIDAZOLE 0.75 % cream    METROCREAM          MULTI-VITAMINS Tabs      Take 1 tablet by mouth daily        naproxen 500 MG tablet    NAPROSYN     Take 1 tablet by mouth daily as needed

## 2018-09-17 NOTE — NURSING NOTE
Patient presents to the clinic for pelvic pressure and urinary urgency that started this morning. Patient has concerns regarding a UTI.  Keeley GUERRERO CMA...9/17/2018 4:45 PM

## 2018-09-17 NOTE — PROGRESS NOTES
Nursing Notes:   Keeley Stoner CMA  2018  4:59 PM  Signed  Patient presents to the clinic for pelvic pressure and urinary urgency that started this morning. Patient has concerns regarding a UTI.  Keeley GUERRERO CMA...2018 4:45 PM    SUBJECTIVE:   Carolyne Medley is a 78 year old female who presents to clinic today for the following health issues:    HPI  Carolyne is here for concerns of UTI symptoms that just started this morning.  She has had them in the past, and did not want it to get worse.  She has not tried anything for it.  + pelvic pressure, not feeling like she is completely emptying her bladder and + urgency.  No hematuria noted by patient.    Patient Active Problem List    Diagnosis Date Noted     Bilateral sensorineural hearing loss 2018     Priority: Medium     Wears hearing aids.       Acne rosacea 2018     Priority: Medium     Backache 2018     Priority: Medium     Pseudoexfoliation open-angle glaucoma, bilateral 2017     Priority: Medium     Age-related nuclear cataract, bilateral 2017     Priority: Medium     Female cystocele 2017     Priority: Medium     Borderline osteopenia 2016     Priority: Medium     Overview:   DEXA 2016       Past Medical History:   Diagnosis Date     Personal history of diseases of skin or subcutaneous tissue     No Comments Provided     Personal history of other medical treatment (CODE)           Past Surgical History:   Procedure Laterality Date     HYSTERECTOMY TOTAL ABDOMINAL, BILATERAL SALPINGO-OOPHORECTOMY, [place] bladder suspension     OTHER SURGICAL HISTORY      2010,[id number],CYSTOCELE REPAIR,[place]     Family History   Problem Relation Age of Onset     Osteoporosis Mother      Osteoporosis     Other - See Comments Mother      Dementia     Breast Cancer No family hx of      Cancer-breast     Social History   Substance Use Topics     Smoking status: Former Smoker     Quit  "date: 1/13/1964     Smokeless tobacco: Never Used     Alcohol use 0.5 oz/week      Comment: Alcoholic Drinks/day: occasional     Social History     Social History Narrative    , [place].   Has one daughter and lives in the [place] area.     Current Outpatient Prescriptions   Medication Sig Dispense Refill     ciprofloxacin (CIPRO) 500 MG tablet Take 1 tablet (500 mg) by mouth 2 times daily for 7 days 14 tablet 0     albuterol (PROAIR HFA/PROVENTIL HFA/VENTOLIN HFA) 108 (90 BASE) MCG/ACT Inhaler Inhale 2 puffs into the lungs every 6 hours as needed for shortness of breath / dyspnea       estrogens, conjugated, (PREMARIN) 0.625 MG tablet Take 1 tablet by mouth every other day       metroNIDAZOLE (METROCREAM) 0.75 % cream        Multiple Vitamin (MULTI-VITAMINS) TABS Take 1 tablet by mouth daily       naproxen (NAPROSYN) 500 MG tablet Take 1 tablet by mouth daily as needed       Allergies   Allergen Reactions     Food      Fruits and vegetables      Penicillin G      rash     Latex Rash     Nickel Rash     Rash,runny nose, throat issue     Penicillins Rash     Review of Systems   Constitutional: Negative for chills and fever.   Gastrointestinal: Negative for abdominal pain, nausea and vomiting.   Genitourinary: Negative for flank pain.      OBJECTIVE:     /64 (BP Location: Left arm, Patient Position: Sitting, Cuff Size: Adult Regular)  Pulse 72  Temp 97.6  F (36.4  C) (Tympanic)  Ht 5' 1.5\" (1.562 m)  Wt 133 lb 3 oz (60.4 kg)  BMI 24.76 kg/m2  Body mass index is 24.76 kg/(m^2).  Physical Exam   Constitutional: She appears well-developed and well-nourished.   HENT:   Head: Normocephalic and atraumatic.   Pulmonary/Chest: Effort normal.   Abdominal: Soft. Bowel sounds are normal.   Skin: No rash noted.   Psychiatric: She has a normal mood and affect. Judgment normal.   Nursing note and vitals reviewed.      Diagnostic Test Results:  Results for orders placed or performed in visit on 09/17/18 "   Urinalysis w Reflex Microscopic If Positive   Result Value Ref Range    Color Urine Yellow     Appearance Urine Clear     Glucose Urine Negative NEG^Negative mg/dL    Bilirubin Urine Negative NEG^Negative    Ketones Urine Negative NEG^Negative mg/dL    Specific Gravity Urine 1.020 1.000 - 1.030    Blood Urine Small (A) NEG^Negative    pH Urine 6.0 5.0 - 9.0 pH    Protein Albumin Urine Trace (A) NEG^Negative mg/dL    Urobilinogen Urine 0.2 0.2 - 1.0 EU/dL    Nitrite Urine Negative NEG^Negative    Leukocyte Esterase Urine Large (A) NEG^Negative    Source Midstream Urine    Urine Microscopic   Result Value Ref Range    WBC Urine >100 (A) OTO5^0 - 5 /HPF    RBC Urine 2-5 (A) OTO2^O - 2 /HPF    Bacteria Urine Few (A) NEG^Negative /HPF   Urine Culture Aerobic Bacterial   Result Value Ref Range    Specimen Description Midstream Urine     Culture Micro (A)      50,000 to 100,000 colonies/mL  Beta hemolytic Streptococcus group B  This organism is intrinsically susceptible to penicillin. If treatment is required AND   this patient cannot be treated with penicillin, please contact the Microbiology Department   within 48 hours to request sensitivity testing.           ASSESSMENT/PLAN:     1. Urinary problem  - Urinalysis w Reflex Microscopic If Positive  - Urine Microscopic    2. Acute cystitis without hematuria  UA with evidence of UTI.  UC will be sent.  She will be started on treatment of ciprofloxacin BID x 7 days.  She has tolerated this medication in the past.  Side effects reviewed; and encouraged not to take along with her Ca - but to space out by ~2 hours if possible.  Continue to monitor for improvement; and if N/V, flank pain, fevers, etc develop - to be seen.  She will be notified when culture returns.  - Urine Culture Aerobic Bacterial  - ciprofloxacin (CIPRO) 500 MG tablet; Take 1 tablet (500 mg) by mouth 2 times daily for 7 days  Dispense: 14 tablet; Refill: 0      DO MERVIN Hammer [facility] AND  [facility]

## 2018-09-19 ASSESSMENT — ENCOUNTER SYMPTOMS
FEVER: 0
VOMITING: 0
FLANK PAIN: 0
ABDOMINAL PAIN: 0
CHILLS: 0
NAUSEA: 0

## 2018-09-20 LAB
BACTERIA SPEC CULT: ABNORMAL
SPECIMEN SOURCE: ABNORMAL

## 2018-11-20 ENCOUNTER — OFFICE VISIT (OUTPATIENT)
Dept: FAMILY MEDICINE | Facility: OTHER | Age: 78
End: 2018-11-20
Attending: PHYSICIAN ASSISTANT
Payer: MEDICARE

## 2018-11-20 VITALS
DIASTOLIC BLOOD PRESSURE: 70 MMHG | OXYGEN SATURATION: 96 % | HEART RATE: 76 BPM | BODY MASS INDEX: 24.72 KG/M2 | WEIGHT: 133 LBS | SYSTOLIC BLOOD PRESSURE: 104 MMHG

## 2018-11-20 DIAGNOSIS — T16.2XXA FOREIGN BODY IN EAR, LEFT, INITIAL ENCOUNTER: Primary | ICD-10-CM

## 2018-11-20 PROCEDURE — G0463 HOSPITAL OUTPT CLINIC VISIT: HCPCS

## 2018-11-20 PROCEDURE — 99213 OFFICE O/P EST LOW 20 MIN: CPT | Performed by: PHYSICIAN ASSISTANT

## 2018-11-20 NOTE — PATIENT INSTRUCTIONS
Patient feels she has a partial q-tip on left ear canal  She removed part of it at home  Q-tip is not visualized on exam  Ear lavage complete by nurse did not produce any foreign body.   Keep ear dry for next few days, can shower per usual. Do not submerge head in tub or pool  Tylenol and ibuprofen as needed for discomfort  Follow up with PCP if symptoms persist or worsen  Seek immediate care for    Ear pain that gets worse    Fever of 100.4F F (38 C) or higher, or as directed by your healthcare provider    Worsening wax buildup    Severe pain, dizziness, or nausea    Bleeding from the ear    Hearing problems    Signs of irritation from the eardrops, such as burning, stinging, or swelling and tenderness    Foul-smelling fluid draining from the ear    Swelling, redness, or tenderness of the outer ear    Headache, neck pain, or stiff neck

## 2018-11-20 NOTE — PROGRESS NOTES
SUBJECTIVE:  Carolyne Medley is a 78 year old female presents to clinic for foreign body in her left ear canal. She broke it off in her canal 2 days ago and feels it was only partially removed. She now notes: slightly muffled hearing on left, no pain, bleeding or drainage.     Treatments: none  She wears a hearing aid on right  History of allergies. She is not currently using an antihistamine or Flonase.     Past Medical History:   Diagnosis Date     Personal history of diseases of skin or subcutaneous tissue     No Comments Provided     Personal history of other medical treatment (CODE)          Current Outpatient Prescriptions   Medication     albuterol (PROAIR HFA/PROVENTIL HFA/VENTOLIN HFA) 108 (90 BASE) MCG/ACT Inhaler     estrogens, conjugated, (PREMARIN) 0.625 MG tablet     metroNIDAZOLE (METROCREAM) 0.75 % cream     Multiple Vitamin (MULTI-VITAMINS) TABS     naproxen (NAPROSYN) 500 MG tablet     No current facility-administered medications for this visit.         Allergies   Allergen Reactions     Food      Fruits and vegetables      Penicillin G      rash     Latex Rash     Nickel Rash     Rash,runny nose, throat issue     Penicillins Rash       ROS  General: feels well, no fever  HENT: muffled hearing on left, possible foreign body    OBJECTIVE:  /70  Pulse 76  Wt 133 lb (60.3 kg)  SpO2 96%  BMI 24.72 kg/m2     General appearance: healthy and NAD.    Ears: abnormal: R TM normal; L TM mild middle ear effusion, no foreign body noted. Ear lavage completed with no objects recovered. Ear canal is mildly irritated from procedure. No bleeding noted.       ASSESSMENT:  (T16.2XXA) Foreign body in ear, left, initial encounter  (primary encounter diagnosis)    Plan:     Foreign body is not found on exam or by ear lavage  TM and canal without infection. Mild effusion on left  Discussed use of antihistamine daily x 1-2 weeks and OTC Flonase to reduce middle ear fluid  Tylenol or ibuprofen as  needed  Follow up with PCP if symptoms persist or worsen  Patient received verbal and written instruction including review of warning signs    Vesna Decker PA-C on 11/20/2018 at 6:21 PM

## 2018-11-20 NOTE — MR AVS SNAPSHOT
After Visit Summary   11/20/2018    Carolyne Medley    MRN: [id number]           Patient Information     Date Of Birth          1940        Visit Information        Provider Department      11/20/2018 4:45 PM Vesna Decker PA-C [facility]        Today's Diagnoses     Foreign body in ear, left, initial encounter    -  1      Care Instructions    Patient feels she has a partial q-tip on left ear canal  She removed part of it at home  Q-tip is not visualized on exam  Ear lavage complete by nurse did not produce any foreign body.   Keep ear dry for next few days, can shower per usual. Do not submerge head in tub or pool  Tylenol and ibuprofen as needed for discomfort  Follow up with PCP if symptoms persist or worsen  Seek immediate care for    Ear pain that gets worse    Fever of 100.4F F (38 C) or higher, or as directed by your healthcare provider    Worsening wax buildup    Severe pain, dizziness, or nausea    Bleeding from the ear    Hearing problems    Signs of irritation from the eardrops, such as burning, stinging, or swelling and tenderness    Foul-smelling fluid draining from the ear    Swelling, redness, or tenderness of the outer ear    Headache, neck pain, or stiff neck            Follow-ups after your visit        Follow-up notes from your care team     Return if symptoms worsen or fail to improve.      Who to contact     If you have questions or need follow up information about today's clinic visit or your schedule please contact [facility] AND [facility] directly at [phone].  Normal or non-critical lab and imaging results will be communicated to you by MyChart, letter or phone within 4 business days after the clinic has received the results. If you do not hear from us within 7 days, please contact the clinic through MyChart or phone. If you have a critical or abnormal lab result, we will notify you by phone as soon as possible.  Submit refill requests  through Offerboard or call your pharmacy and they will forward the refill request to us. Please allow 3 business days for your refill to be completed.          Additional Information About Your Visit        Care EveryWhere ID     This is your Care EveryWhere ID. This could be used by other organizations to access your [place] medical records  [id number]        Your Vitals Were     Pulse Pulse Oximetry BMI (Body Mass Index)             76 96% 24.72 kg/m2          Blood Pressure from Last 3 Encounters:   11/20/18 104/70   09/17/18 110/64   03/23/18 110/78    Weight from Last 3 Encounters:   11/20/18 133 lb (60.3 kg)   09/17/18 133 lb 3 oz (60.4 kg)   03/23/18 130 lb 8 oz (59.2 kg)              Today, you had the following     No orders found for display       Primary Care Provider Office Phone # Fax #    Jcakie Marline Gtz [phone]       [address] [facility] [address]        Equal Access to Services     [facility]: Hadii aad ku hadasho Soomaali, waaxda luqadaha, qaybta kaalmada adeegyada, waxay denizin haytiarra jacob . So [facility] [phone].    ATENCIÓN: Si habla español, tiene a swift disposición servicios gratuitos de asistencia lingüística. [facility] [phone].    We comply with applicable federal civil rights laws and [place] laws. We do not discriminate on the basis of race, color, national origin, age, disability, sex, sexual orientation, or gender identity.            Thank you!     Thank you for choosing [facility] AND [facility]  for your care. Our goal is always to provide you with excellent care. Hearing back from our patients is one way we can continue to improve our services. Please take a few minutes to complete the written survey that you may receive in the mail after your visit with us. Thank you!             Your Updated Medication List - Protect others around you: Learn how to safely use, store and throw away your medicines at  www.disposemymeds.org.          This list is accurate as of 11/20/18  6:03 PM.  Always use your most recent med list.                   Brand Name Dispense Instructions for use Diagnosis    albuterol 108 (90 Base) MCG/ACT inhaler    PROAIR HFA/PROVENTIL HFA/VENTOLIN HFA     Inhale 2 puffs into the lungs every 6 hours as needed for shortness of breath / dyspnea        estrogens (conjugated) 0.625 MG tablet    PREMARIN     Take 1 tablet by mouth every other day        metroNIDAZOLE 0.75 % cream    METROCREAM          MULTI-VITAMINS Tabs      Take 1 tablet by mouth daily        naproxen 500 MG tablet    NAPROSYN     Take 1 tablet by mouth daily as needed

## 2018-11-20 NOTE — NURSING NOTE
Patient presents to clinic today for possible q-tip in patients left ear. This happened 2 days.  Nataliia Ballard CMA..............11/20/2018........5:33 PM    Medication Reconciliation: complete    Nataliia Ballard CMA

## 2019-09-11 ENCOUNTER — HOSPITAL ENCOUNTER (EMERGENCY)
Facility: OTHER | Age: 79
Discharge: ANOTHER HOSPITAL | End: 2019-09-12
Attending: FAMILY MEDICINE | Admitting: FAMILY MEDICINE
Payer: MEDICARE

## 2019-09-11 ENCOUNTER — APPOINTMENT (OUTPATIENT)
Dept: CT IMAGING | Facility: OTHER | Age: 79
End: 2019-09-11
Attending: FAMILY MEDICINE
Payer: MEDICARE

## 2019-09-11 ENCOUNTER — APPOINTMENT (OUTPATIENT)
Dept: GENERAL RADIOLOGY | Facility: OTHER | Age: 79
End: 2019-09-11
Attending: FAMILY MEDICINE
Payer: MEDICARE

## 2019-09-11 DIAGNOSIS — K56.609 SMALL BOWEL OBSTRUCTION (H): ICD-10-CM

## 2019-09-11 LAB
ALBUMIN SERPL-MCNC: 4.3 G/DL (ref 3.5–5.7)
ALBUMIN UR-MCNC: ABNORMAL MG/DL
ALP SERPL-CCNC: 39 U/L (ref 34–104)
ALT SERPL W P-5'-P-CCNC: 15 U/L (ref 7–52)
ANION GAP SERPL CALCULATED.3IONS-SCNC: 13 MMOL/L (ref 3–14)
APPEARANCE UR: CLEAR
AST SERPL W P-5'-P-CCNC: 21 U/L (ref 13–39)
BACTERIA #/AREA URNS HPF: ABNORMAL /HPF
BASOPHILS # BLD AUTO: 0.1 10E9/L (ref 0–0.2)
BASOPHILS NFR BLD AUTO: 0.5 %
BILIRUB SERPL-MCNC: 2.2 MG/DL (ref 0.3–1)
BILIRUB UR QL STRIP: ABNORMAL
BUN SERPL-MCNC: 25 MG/DL (ref 7–25)
CALCIUM SERPL-MCNC: 9.3 MG/DL (ref 8.6–10.3)
CHLORIDE SERPL-SCNC: 85 MMOL/L (ref 98–107)
CO2 SERPL-SCNC: 32 MMOL/L (ref 21–31)
COLOR UR AUTO: YELLOW
CREAT SERPL-MCNC: 0.77 MG/DL (ref 0.6–1.2)
DIFFERENTIAL METHOD BLD: NORMAL
EOSINOPHIL # BLD AUTO: 0.1 10E9/L (ref 0–0.7)
EOSINOPHIL NFR BLD AUTO: 0.7 %
ERYTHROCYTE [DISTWIDTH] IN BLOOD BY AUTOMATED COUNT: 12.4 % (ref 10–15)
GFR SERPL CREATININE-BSD FRML MDRD: 72 ML/MIN/{1.73_M2}
GLUCOSE SERPL-MCNC: 107 MG/DL (ref 70–105)
GLUCOSE UR STRIP-MCNC: NEGATIVE MG/DL
HCT VFR BLD AUTO: 42.5 % (ref 35–47)
HGB BLD-MCNC: 15.1 G/DL (ref 11.7–15.7)
HGB UR QL STRIP: NEGATIVE
IMM GRANULOCYTES # BLD: 0 10E9/L (ref 0–0.4)
IMM GRANULOCYTES NFR BLD: 0.4 %
KETONES UR STRIP-MCNC: 15 MG/DL
LEUKOCYTE ESTERASE UR QL STRIP: NEGATIVE
LIPASE SERPL-CCNC: 34 U/L (ref 11–82)
LYMPHOCYTES # BLD AUTO: 0.8 10E9/L (ref 0.8–5.3)
LYMPHOCYTES NFR BLD AUTO: 8.6 %
MCH RBC QN AUTO: 31.3 PG (ref 26.5–33)
MCHC RBC AUTO-ENTMCNC: 35.5 G/DL (ref 31.5–36.5)
MCV RBC AUTO: 88 FL (ref 78–100)
MONOCYTES # BLD AUTO: 1.1 10E9/L (ref 0–1.3)
MONOCYTES NFR BLD AUTO: 11.1 %
NEUTROPHILS # BLD AUTO: 7.7 10E9/L (ref 1.6–8.3)
NEUTROPHILS NFR BLD AUTO: 78.7 %
NITRATE UR QL: NEGATIVE
NON-SQ EPI CELLS #/AREA URNS LPF: ABNORMAL /LPF
PH UR STRIP: 5 PH (ref 5–9)
PLATELET # BLD AUTO: 282 10E9/L (ref 150–450)
POTASSIUM SERPL-SCNC: 2.9 MMOL/L (ref 3.5–5.1)
PROT SERPL-MCNC: 7 G/DL (ref 6.4–8.9)
RBC # BLD AUTO: 4.82 10E12/L (ref 3.8–5.2)
RBC #/AREA URNS AUTO: ABNORMAL /HPF
SODIUM SERPL-SCNC: 130 MMOL/L (ref 134–144)
SOURCE: ABNORMAL
SP GR UR STRIP: >1.03 (ref 1–1.03)
UROBILINOGEN UR STRIP-ACNC: 1 EU/DL (ref 0.2–1)
WBC # BLD AUTO: 9.8 10E9/L (ref 4–11)
WBC #/AREA URNS AUTO: ABNORMAL /HPF

## 2019-09-11 PROCEDURE — 36415 COLL VENOUS BLD VENIPUNCTURE: CPT | Performed by: FAMILY MEDICINE

## 2019-09-11 PROCEDURE — 25000128 H RX IP 250 OP 636: Performed by: FAMILY MEDICINE

## 2019-09-11 PROCEDURE — 99284 EMERGENCY DEPT VISIT MOD MDM: CPT | Mod: Z6 | Performed by: FAMILY MEDICINE

## 2019-09-11 PROCEDURE — 83690 ASSAY OF LIPASE: CPT | Performed by: FAMILY MEDICINE

## 2019-09-11 PROCEDURE — 99284 EMERGENCY DEPT VISIT MOD MDM: CPT | Mod: 25 | Performed by: FAMILY MEDICINE

## 2019-09-11 PROCEDURE — 25500064 ZZH RX 255 OP 636: Performed by: FAMILY MEDICINE

## 2019-09-11 PROCEDURE — 74177 CT ABD & PELVIS W/CONTRAST: CPT

## 2019-09-11 PROCEDURE — 81001 URINALYSIS AUTO W/SCOPE: CPT | Performed by: FAMILY MEDICINE

## 2019-09-11 PROCEDURE — 85025 COMPLETE CBC W/AUTO DIFF WBC: CPT | Performed by: FAMILY MEDICINE

## 2019-09-11 PROCEDURE — 74019 RADEX ABDOMEN 2 VIEWS: CPT

## 2019-09-11 PROCEDURE — 96375 TX/PRO/DX INJ NEW DRUG ADDON: CPT | Performed by: FAMILY MEDICINE

## 2019-09-11 PROCEDURE — 96365 THER/PROPH/DIAG IV INF INIT: CPT | Mod: XU | Performed by: FAMILY MEDICINE

## 2019-09-11 PROCEDURE — 80053 COMPREHEN METABOLIC PANEL: CPT | Performed by: FAMILY MEDICINE

## 2019-09-11 RX ORDER — POTASSIUM CHLORIDE 7.45 MG/ML
10 INJECTION INTRAVENOUS CONTINUOUS
Status: DISCONTINUED | OUTPATIENT
Start: 2019-09-11 | End: 2019-09-12 | Stop reason: HOSPADM

## 2019-09-11 RX ORDER — SODIUM CHLORIDE 9 MG/ML
1000 INJECTION, SOLUTION INTRAVENOUS CONTINUOUS
Status: DISCONTINUED | OUTPATIENT
Start: 2019-09-12 | End: 2019-09-12 | Stop reason: HOSPADM

## 2019-09-11 RX ORDER — ONDANSETRON 2 MG/ML
4 INJECTION INTRAMUSCULAR; INTRAVENOUS ONCE
Status: COMPLETED | OUTPATIENT
Start: 2019-09-11 | End: 2019-09-11

## 2019-09-11 RX ORDER — HYDROMORPHONE HYDROCHLORIDE 1 MG/ML
0.5 INJECTION, SOLUTION INTRAMUSCULAR; INTRAVENOUS; SUBCUTANEOUS ONCE
Status: COMPLETED | OUTPATIENT
Start: 2019-09-11 | End: 2019-09-11

## 2019-09-11 RX ADMIN — ONDANSETRON HYDROCHLORIDE 4 MG: 2 INJECTION, SOLUTION INTRAMUSCULAR; INTRAVENOUS at 22:20

## 2019-09-11 RX ADMIN — HYDROMORPHONE HYDROCHLORIDE 0.5 MG: 1 INJECTION, SOLUTION INTRAMUSCULAR; INTRAVENOUS; SUBCUTANEOUS at 22:20

## 2019-09-11 RX ADMIN — IOHEXOL 100 ML: 350 INJECTION, SOLUTION INTRAVENOUS at 22:38

## 2019-09-11 RX ADMIN — POTASSIUM CHLORIDE 10 MEQ: 7.46 INJECTION, SOLUTION INTRAVENOUS at 22:50

## 2019-09-11 ASSESSMENT — MIFFLIN-ST. JEOR: SCORE: 1027

## 2019-09-12 ENCOUNTER — TRANSFERRED RECORDS (OUTPATIENT)
Dept: HEALTH INFORMATION MANAGEMENT | Facility: OTHER | Age: 79
End: 2019-09-12

## 2019-09-12 ENCOUNTER — APPOINTMENT (OUTPATIENT)
Dept: GENERAL RADIOLOGY | Facility: OTHER | Age: 79
End: 2019-09-12
Attending: FAMILY MEDICINE
Payer: MEDICARE

## 2019-09-12 VITALS
BODY MASS INDEX: 24.29 KG/M2 | SYSTOLIC BLOOD PRESSURE: 126 MMHG | DIASTOLIC BLOOD PRESSURE: 76 MMHG | HEIGHT: 62 IN | WEIGHT: 132 LBS | HEART RATE: 79 BPM | OXYGEN SATURATION: 95 % | TEMPERATURE: 98.7 F | RESPIRATION RATE: 18 BRPM

## 2019-09-12 PROCEDURE — 40000986 XR CHEST PORT 1 VW

## 2019-09-12 PROCEDURE — 25000128 H RX IP 250 OP 636: Performed by: FAMILY MEDICINE

## 2019-09-12 RX ADMIN — SODIUM CHLORIDE 500 ML: 9 INJECTION, SOLUTION INTRAVENOUS at 00:26

## 2019-09-12 NOTE — ED NOTES
Patient tolerated NG placement, 450mls of GI contents in canister. Laura Rose RN on 9/12/2019 at 12:30 AM

## 2019-09-12 NOTE — ED TRIAGE NOTES
Pt presents to the ED with c/o abd pain and bloating. Pt reports last BM was Sunday. Pt has been vomiting intermittently and has no appetite.    Alissa Irizarry RN on 9/11/2019 at 8:10 PM

## 2019-09-12 NOTE — ED PROVIDER NOTES
History     Chief Complaint   Patient presents with     Bloated     Constipation     HPI  Carolyne Medley is a 79 year old female who has felt bloated for about 5 days.  Has not been passing gas, and having lower quadrant pain.  Having vomiting with any oral intake.  Has basically not eaten for 3 days.    Only past surgical abdominal hx is a hysterectomy.    No fevers.    No melena or hematochezia.    Allergies:  Allergies   Allergen Reactions     Food      Fruits and vegetables      Penicillin G      rash     Latex Rash     Nickel Rash     Rash,runny nose, throat issue     Penicillins Rash       Problem List:    Patient Active Problem List    Diagnosis Date Noted     Bilateral sensorineural hearing loss 03/20/2018     Priority: Medium     Wears hearing aids.       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     Priority: Medium     Age-related nuclear cataract, bilateral 12/22/2017     Priority: Medium     Female cystocele 12/05/2017     Priority: Medium     Borderline osteopenia 12/19/2016     Priority: Medium     Overview:   DEXA 12/2016          Past Medical History:    Past Medical History:   Diagnosis Date     Personal history of diseases of skin or subcutaneous tissue      Personal history of other medical treatment (CODE)        Past Surgical History:    Past Surgical History:   Procedure Laterality Date     Cytocele repair  2010    [place]      HYSTERECTOMY TOTAL ABDOMINAL, BILATERAL SALPINGO-OOPHORECTOMY, COMBINED  1992    [place] bladder suspension       Family History:    Family History   Problem Relation Age of Onset     Osteoporosis Mother         Osteoporosis     Other - See Comments Mother         Dementia     Breast Cancer No family hx of         Cancer-breast       Social History:  Marital Status:   [2]  Social History     Tobacco Use     Smoking status:  "Former Smoker     Last attempt to quit: 1964     Years since quittin.6     Smokeless tobacco: Never Used   Substance Use Topics     Alcohol use: Yes     Alcohol/week: 0.5 oz     Comment: Alcoholic Drinks/day: occasional     Drug use: No     Types: Other     Comment: Drug use: No        Medications:      naproxen (NAPROSYN) 500 MG tablet   albuterol (PROAIR HFA/PROVENTIL HFA/VENTOLIN HFA) 108 (90 BASE) MCG/ACT Inhaler   estrogens, conjugated, (PREMARIN) 0.625 MG tablet   metroNIDAZOLE (METROCREAM) 0.75 % cream   Multiple Vitamin (MULTI-VITAMINS) TABS         Review of Systems no fevers, chills, rigors, HEENT, Chest pain, SOB, flank pains.  No diarrhea.    Physical Exam   BP: 112/78  Pulse: 111  Temp: 98.7  F (37.1  C)  Resp: 18  Height: 157.5 cm (5' 2\")  Weight: 59.9 kg (132 lb)  SpO2: 94 %      Physical Exam well woman.  Stable vitals.  Slight tachycardia noted.  Heart reg.  Lungs clear.  No CVAT.  abd tender in both lower quadrants, but only mildly so.  No rebound or involuntary guarding.  Negative McBurney's and negative Villa's.  She does have bowel sounds.  No hernias noted.  Some distension is noted.  K is 2.9--- potassium IV given.  Other diagnostics reassuring.  Xray shows likely bowel obstruction.  CT pending at time of dictation- my read yields findings suggesting a small bowel obstruction.    ED Course        Procedures               Critical Care time:  none               Results for orders placed or performed during the hospital encounter of 19 (from the past 24 hour(s))   UA reflex to Microscopic and Culture   Result Value Ref Range    Color Urine Yellow     Appearance Urine Clear     Glucose Urine Negative NEG^Negative mg/dL    Bilirubin Urine Small (A) NEG^Negative    Ketones Urine 15 (A) NEG^Negative mg/dL    Specific Gravity Urine >1.030 (H) 1.000 - 1.030    Blood Urine Negative NEG^Negative    pH Urine 5.0 5.0 - 9.0 pH    Protein Albumin Urine Trace (A) NEG^Negative mg/dL    " Urobilinogen Urine 1.0 0.2 - 1.0 EU/dL    Nitrite Urine Negative NEG^Negative    Leukocyte Esterase Urine Negative NEG^Negative    Source Midstream Urine    Urine Microscopic   Result Value Ref Range    WBC Urine 0 - 5 OTO5^0 - 5 /HPF    RBC Urine O - 2 OTO2^O - 2 /HPF    Squamous Epithelial /LPF Urine Few FEW^Few /LPF    Bacteria Urine Many (A) NEG^Negative /HPF   CBC with platelets differential   Result Value Ref Range    WBC 9.8 4.0 - 11.0 10e9/L    RBC Count 4.82 3.8 - 5.2 10e12/L    Hemoglobin 15.1 11.7 - 15.7 g/dL    Hematocrit 42.5 35.0 - 47.0 %    MCV 88 78 - 100 fl    MCH 31.3 26.5 - 33.0 pg    MCHC 35.5 31.5 - 36.5 g/dL    RDW 12.4 10.0 - 15.0 %    Platelet Count 282 150 - 450 10e9/L    Diff Method Automated Method     % Neutrophils 78.7 %    % Lymphocytes 8.6 %    % Monocytes 11.1 %    % Eosinophils 0.7 %    % Basophils 0.5 %    % Immature Granulocytes 0.4 %    Absolute Neutrophil 7.7 1.6 - 8.3 10e9/L    Absolute Lymphocytes 0.8 0.8 - 5.3 10e9/L    Absolute Monocytes 1.1 0.0 - 1.3 10e9/L    Absolute Eosinophils 0.1 0.0 - 0.7 10e9/L    Absolute Basophils 0.1 0.0 - 0.2 10e9/L    Abs Immature Granulocytes 0.0 0 - 0.4 10e9/L   Comprehensive metabolic panel   Result Value Ref Range    Sodium 130 (L) 134 - 144 mmol/L    Potassium 2.9 (L) 3.5 - 5.1 mmol/L    Chloride 85 (L) 98 - 107 mmol/L    Carbon Dioxide 32 (H) 21 - 31 mmol/L    Anion Gap 13 3 - 14 mmol/L    Glucose 107 (H) 70 - 105 mg/dL    Urea Nitrogen 25 7 - 25 mg/dL    Creatinine 0.77 0.60 - 1.20 mg/dL    GFR Estimate 72 >60 mL/min/[1.73_m2]    GFR Estimate If Black 88 >60 mL/min/[1.73_m2]    Calcium 9.3 8.6 - 10.3 mg/dL    Bilirubin Total 2.2 (H) 0.3 - 1.0 mg/dL    Albumin 4.3 3.5 - 5.7 g/dL    Protein Total 7.0 6.4 - 8.9 g/dL    Alkaline Phosphatase 39 34 - 104 U/L    ALT 15 7 - 52 U/L    AST 21 13 - 39 U/L   Lipase   Result Value Ref Range    Lipase 34 11 - 82 U/L   XR Abdomen 2 Views    Narrative    XR ABDOMEN 2 VW   9/11/2019 9:58  PM    History:Female,age  79 years, abd pain    Comparison: None    FINDINGS: Two views are submitted. Gas-filled, distended loops of  small bowel are seen in the upper abdomen. Moderate volume of stool  suggests within the proximal colon. Numerous air-fluid levels are seen  in the upright examination without evidence of free air.      Impression    IMPRESSION:  Findings concerning for small bowel obstruction.     No evidence of free intraperitoneal air..    HORACIO TARIQ MD       Medications   potassium chloride 10 mEq in 100 mL sterile water intermittent infusion (premix) (10 mEq Intravenous New Bag 9/11/19 2250)   ondansetron (ZOFRAN) injection 4 mg (4 mg Intravenous Given 9/11/19 2220)   HYDROmorphone (PF) (DILAUDID) injection 0.5 mg (0.5 mg Intravenous Given 9/11/19 2220)   iohexol (OMNIPAQUE) 350 mg/mL solution 100 mL (100 mLs Intravenous Given 9/11/19 2238)       Assessments & Plan (with Medical Decision Making)     I have reviewed the nursing notes.    I have reviewed the findings, diagnosis, plan and need for follow up with the patient.   due to medical/surgical diversion here at [facility], patient will be transferred to another facility.  Given pain and nausea medication for her symptoms.    [facility] also on full diversion; she is finally accepted at [facility],  in acute care surgery accepting.  He recommended IV fluid bolus with maintenance for the ground ambulance, as well as an NG tube.    Patient transferred in stable condition to [facility].    CT scan was not yet read at time of phone call and transfer paperwork.  It had been over 90 minutes since scan performed; I called our tech twice to get it read, to no avail.       At 12:06am, I transferred care to  as my shift had ended.  She is aware of the case, and the need for NG tub placement.  This will likely be placed by nursing prior to transfer, according to 's wishes.    0006 - the patient is checked out to me at the  routine change of shift. NG tube placed and patient transferred.     New Prescriptions    No medications on file       Final diagnoses:   Small bowel obstruction (H)       9/11/2019   [facility] AND [facility]     Home Sung MD  09/11/19 2334       Home Sung MD  09/12/19 0001       Home Sung MD  09/12/19 1049       Jorge Tamayo MD  09/18/19 1789

## 2019-09-12 NOTE — ED NOTES
Patient resting comfortably, pain is gone, no further nausea. Laura Rose RN on 9/11/2019 at 10:56 PM

## 2019-10-17 ENCOUNTER — OFFICE VISIT (OUTPATIENT)
Dept: FAMILY MEDICINE | Facility: OTHER | Age: 79
End: 2019-10-17
Attending: NURSE PRACTITIONER
Payer: MEDICARE

## 2019-10-17 VITALS
OXYGEN SATURATION: 98 % | BODY MASS INDEX: 22.66 KG/M2 | HEIGHT: 62 IN | SYSTOLIC BLOOD PRESSURE: 112 MMHG | HEART RATE: 75 BPM | RESPIRATION RATE: 16 BRPM | WEIGHT: 123.13 LBS | DIASTOLIC BLOOD PRESSURE: 64 MMHG | TEMPERATURE: 98.6 F

## 2019-10-17 DIAGNOSIS — E87.6 HYPOKALEMIA: ICD-10-CM

## 2019-10-17 DIAGNOSIS — J45.20 MILD INTERMITTENT ASTHMA, UNSPECIFIED WHETHER COMPLICATED: Primary | ICD-10-CM

## 2019-10-17 LAB
ANION GAP SERPL CALCULATED.3IONS-SCNC: 8 MMOL/L (ref 3–14)
BUN SERPL-MCNC: 13 MG/DL (ref 7–25)
CALCIUM SERPL-MCNC: 10.3 MG/DL (ref 8.6–10.3)
CHLORIDE SERPL-SCNC: 99 MMOL/L (ref 98–107)
CO2 SERPL-SCNC: 31 MMOL/L (ref 21–31)
CREAT SERPL-MCNC: 0.76 MG/DL (ref 0.6–1.2)
GFR SERPL CREATININE-BSD FRML MDRD: 73 ML/MIN/{1.73_M2}
GLUCOSE SERPL-MCNC: 95 MG/DL (ref 70–105)
POTASSIUM SERPL-SCNC: 3.8 MMOL/L (ref 3.5–5.1)
SODIUM SERPL-SCNC: 138 MMOL/L (ref 134–144)

## 2019-10-17 PROCEDURE — 36415 COLL VENOUS BLD VENIPUNCTURE: CPT | Mod: ZL | Performed by: NURSE PRACTITIONER

## 2019-10-17 PROCEDURE — 80048 BASIC METABOLIC PNL TOTAL CA: CPT | Mod: ZL | Performed by: NURSE PRACTITIONER

## 2019-10-17 PROCEDURE — 99213 OFFICE O/P EST LOW 20 MIN: CPT | Performed by: NURSE PRACTITIONER

## 2019-10-17 PROCEDURE — G0463 HOSPITAL OUTPT CLINIC VISIT: HCPCS

## 2019-10-17 RX ORDER — ALBUTEROL SULFATE 90 UG/1
2 AEROSOL, METERED RESPIRATORY (INHALATION) EVERY 6 HOURS PRN
Qty: 1 INHALER | Refills: 0 | Status: SHIPPED | OUTPATIENT
Start: 2019-10-17 | End: 2019-10-17

## 2019-10-17 RX ORDER — ALBUTEROL SULFATE 90 UG/1
2 AEROSOL, METERED RESPIRATORY (INHALATION) EVERY 6 HOURS PRN
Qty: 1 INHALER | Refills: 0 | Status: SHIPPED | OUTPATIENT
Start: 2019-10-17

## 2019-10-17 ASSESSMENT — MIFFLIN-ST. JEOR: SCORE: 986.74

## 2019-10-17 ASSESSMENT — ENCOUNTER SYMPTOMS
CONSTIPATION: 0
VOMITING: 0
ABDOMINAL PAIN: 0
DIARRHEA: 0
NAUSEA: 0
ABDOMINAL DISTENTION: 0

## 2019-10-17 ASSESSMENT — PAIN SCALES - GENERAL: PAINLEVEL: NO PAIN (0)

## 2019-10-17 NOTE — PROGRESS NOTES
"  SUBJECTIVE:   Carolyne Medley is a 79 year old female who presents to clinic today for the following health issues:    HPI  Patient presents for evaluation of follow-up after hospitalization - for bowel obstruction. She has inguinal and femoral hernia repair with mesh. She has been home about a month. She has no concerns. Incision is healing well. No abdominal pain. She reports to eating better and exercising. She denies constipation. She would like her potassium checked at this visit. She is concerned because it was low during her hospitalization. She is only on a multivitamin for medication.      Patient Active Problem List    Diagnosis Date Noted     Bilateral sensorineural hearing loss 2018     Priority: Medium     Wears hearing aids.       Acne rosacea 2018     Priority: Medium     Backache 2018     Priority: Medium     Pseudoexfoliation open-angle glaucoma, bilateral 2017     Priority: Medium     Age-related nuclear cataract, bilateral 2017     Priority: Medium     Female cystocele 2017     Priority: Medium     Borderline osteopenia 2016     Priority: Medium     Overview:   DEXA 2016       Past Medical History:   Diagnosis Date     Personal history of diseases of skin or subcutaneous tissue     No Comments Provided     Personal history of other medical treatment (CODE)           Past Surgical History:   Procedure Laterality Date     Cytocele repair      [place]      HYSTERECTOMY TOTAL ABDOMINAL, BILATERAL SALPINGO-OOPHORECTOMY, COMBINED      [place] With bladder suspension       Review of Systems   Gastrointestinal: Negative for abdominal distention, abdominal pain, constipation, diarrhea, nausea and vomiting.        OBJECTIVE:     /64 (BP Location: Right arm, Patient Position: Sitting, Cuff Size: Adult Regular)   Pulse 75   Temp 98.6  F (37  C) (Tympanic)   Resp 16   Ht 1.575 m (5' 2\")   Wt 55.8 kg (123 lb 2 oz)  "  LMP  (LMP Unknown)   SpO2 98%   Breastfeeding? No   BMI 22.52 kg/m    Body mass index is 22.52 kg/m .  Physical Exam  Constitutional:       Appearance: Normal appearance. She is normal weight.   Cardiovascular:      Rate and Rhythm: Normal rate and regular rhythm.   Pulmonary:      Effort: Pulmonary effort is normal.      Breath sounds: Normal breath sounds.   Neurological:      Mental Status: She is alert.     Incision well approximated. Well healed.     Diagnostic Test Results:  Results for orders placed or performed in visit on 10/17/19 (from the past 24 hour(s))   Basic Metabolic Panel   Result Value Ref Range    Sodium 138 134 - 144 mmol/L    Potassium 3.8 3.5 - 5.1 mmol/L    Chloride 99 98 - 107 mmol/L    Carbon Dioxide 31 21 - 31 mmol/L    Anion Gap 8 3 - 14 mmol/L    Glucose 95 70 - 105 mg/dL    Urea Nitrogen 13 7 - 25 mg/dL    Creatinine 0.76 0.60 - 1.20 mg/dL    GFR Estimate 73 >60 mL/min/[1.73_m2]    GFR Estimate If Black 89 >60 mL/min/[1.73_m2]    Calcium 10.3 8.6 - 10.3 mg/dL       ASSESSMENT/PLAN:   1. Mild intermittent asthma, unspecified whether complicated  Request refill on inhaler. Uses it rarely. Has flares when she has viral illnesses. No symptoms today.   - albuterol (PROAIR HFA/PROVENTIL HFA/VENTOLIN HFA) 108 (90 Base) MCG/ACT inhaler; Inhale 2 puffs into the lungs every 6 hours as needed for shortness of breath / dyspnea  Dispense: 1 Inhaler; Refill: 0    2. Hypokalemia  Potassium level normal. Likely caused due to acute illness. Plan on continuing on multivitamin, no need for additional supplementation.   - Basic Metabolic Panel; Future    Savannah Waite [facility] AND [facility]

## 2019-10-17 NOTE — NURSING NOTE
Patient presents to clinic for follow up after stay at [facility] 09/12 thru 09/21/19 for right side hernia and small bowel obstruction.    Medication Reconciliation: complete    Betty Stone LPN

## 2020-02-22 ENCOUNTER — OFFICE VISIT (OUTPATIENT)
Dept: FAMILY MEDICINE | Facility: OTHER | Age: 80
End: 2020-02-22
Attending: NURSE PRACTITIONER
Payer: MEDICARE

## 2020-02-22 VITALS
HEIGHT: 62 IN | DIASTOLIC BLOOD PRESSURE: 62 MMHG | SYSTOLIC BLOOD PRESSURE: 110 MMHG | HEART RATE: 77 BPM | BODY MASS INDEX: 22.01 KG/M2 | TEMPERATURE: 99.6 F | RESPIRATION RATE: 18 BRPM | WEIGHT: 119.6 LBS | OXYGEN SATURATION: 97 %

## 2020-02-22 DIAGNOSIS — J06.9 VIRAL UPPER RESPIRATORY TRACT INFECTION: Primary | ICD-10-CM

## 2020-02-22 PROCEDURE — G0463 HOSPITAL OUTPT CLINIC VISIT: HCPCS

## 2020-02-22 PROCEDURE — 99213 OFFICE O/P EST LOW 20 MIN: CPT | Performed by: NURSE PRACTITIONER

## 2020-02-22 ASSESSMENT — MIFFLIN-ST. JEOR: SCORE: 970.75

## 2020-02-22 ASSESSMENT — PAIN SCALES - GENERAL: PAINLEVEL: SEVERE PAIN (6)

## 2020-02-22 NOTE — PROGRESS NOTES
Nursing Notes:   Jess Cordero LPN  2020  1:22 PM  Sign at exiting of workspace  Patient has not been feeling well for 1 day. Their symptoms are cough, runny nose, congestion  and they are taking Nasacort, Albuterol inhaler.  Jess Cordero LPN LPN....................  2020   1:22 PM         SUBJECTIVE:   Carolyne Medley is a 79 year old female who presents to clinic today for the following health issues:    Patient presents to the rapid clinic for evaluation of illness.  She has had symptoms for 1 day.  She developed a cough, runny nose, congestion.  She denies fevers or chills.  She is eating and drinking well.  Her activity is per normal.  She did get an influenza vaccination this season.  She is using over-the-counter medications such as Nasacort for symptom relief.      Problem list and histories reviewed & adjusted, as indicated.  Additional history: as documented    Patient Active Problem List   Diagnosis     Acne rosacea     Backache     Borderline osteopenia     Female cystocele     Pseudoexfoliation open-angle glaucoma, bilateral     Age-related nuclear cataract, bilateral     Bilateral sensorineural hearing loss     Past Surgical History:   Procedure Laterality Date     Cytocele repair      [place]      HYSTERECTOMY TOTAL ABDOMINAL, BILATERAL SALPINGO-OOPHORECTOMY, COMBINED      [place] With bladder suspension       Social History     Tobacco Use     Smoking status: Former Smoker     Packs/day: 0.00     Last attempt to quit: 1964     Years since quittin.1     Smokeless tobacco: Never Used   Substance Use Topics     Alcohol use: Yes     Alcohol/week: 0.8 standard drinks     Comment: Alcoholic Drinks/day: occasional     Family History   Problem Relation Age of Onset     Osteoporosis Mother         Osteoporosis     Other - See Comments Mother         Dementia     Breast Cancer No family hx of         Cancer-breast         Current Outpatient Medications  "  Medication Sig Dispense Refill     albuterol (PROAIR HFA/PROVENTIL HFA/VENTOLIN HFA) 108 (90 Base) MCG/ACT inhaler Inhale 2 puffs into the lungs every 6 hours as needed for shortness of breath / dyspnea 1 Inhaler 0     metroNIDAZOLE (METROCREAM) 0.75 % cream        Multiple Vitamin (MULTI-VITAMINS) TABS Take 1 tablet by mouth daily       naproxen (NAPROSYN) 500 MG tablet Take 1 tablet by mouth daily as needed       Allergies   Allergen Reactions     Food      Fruits and vegetables   Fruits and vegetables   Bananas, strawberries any fruit raw    Causes rash and makes swallowing difficult, and itchy spots     Penicillin G      rash     Latex Rash     Nickel Rash     Rash,runny nose, throat issue     Penicillins Rash         ROS:  Notable findings in the HPI.       OBJECTIVE:     /62 (BP Location: Right arm, Patient Position: Sitting, Cuff Size: Adult Regular)   Pulse 77   Temp 99.6  F (37.6  C) (Tympanic)   Resp 18   Ht 1.575 m (5' 2\")   Wt 54.3 kg (119 lb 9.6 oz)   LMP  (LMP Unknown)   SpO2 97%   BMI 21.88 kg/m    Body mass index is 21.88 kg/m .  GENERAL: healthy, alert and no distress  EYES: Eyes grossly normal to inspection  HENT: normal cephalic/atraumatic, right ear: normal: no effusions, no erythema, normal landmarks, left ear: normal: no effusions, no erythema, normal landmarks, nose and mouth without ulcers or lesions, nasal mucosa edematous , rhinorrhea clear, oropharynx clear and oral mucous membranes moist  NECK: no adenopathy  RESP: lungs clear to auscultation - no rales, rhonchi or wheezes  CV: regular rates and rhythm, normal S1 S2, no S3 or S4, no murmur, click or rub, peripheral pulses strong and no peripheral edema  SKIN: no suspicious lesions or rashes  PSYCH: mentation appears normal, affect normal/bright    Diagnostic Test Results:  none     ASSESSMENT/PLAN:     1. Viral upper respiratory tract infection      PLAN:    URI Adult:  Tylenol, Ibuprofen, Fluids, Rest, OTC cough " suppressant/expectorant, OTC decongestant/antihistamine, Saline gargles, Saline nasal spray and Vaporizer    Symptoms likely due to virus. No antibiotic is needed at this time. Symptoms typically worse on days 2-5 and then stabilize and you are sick for days 5-12. Days 12-14 there is slow resolution and if there is a cough, studies show it can linger longer, however one is not as ill as in the beginning. If symptoms begin worsening or fail to improve after 14 days, return to clinic for reevaluation. All questions were answered and she is in agreement with plan.      Followup:    If not improving or if condition worsens, follow up with your Primary Care Provider    I explained my diagnostic considerations and recommendations to the patient, who voiced understanding and agreement with the treatment plan. All questions were answered. We discussed potential side effects of any prescribed or recommended therapies, as well as expectations for response to treatments. She was advised to contact our office if there is no improvement or worsening of conditions or symptoms.  If s/s worsen or persist, patient will either come back or follow up with PCP.    Disclaimer:  This note consists of words and symbols derived from keyboarding, dictation, or using voice recognition software. As a result, there may be errors in the script that have gone undetected. Please consider this when interpreting information found in this note.      Lindsey Mejia NP, 2/22/2020 1:31 PM

## 2020-02-22 NOTE — PATIENT INSTRUCTIONS
Patient Education     Viral Upper Respiratory Illness (Adult)  You have a viral upper respiratory illness (URI), which is another term for the common cold. This illness is contagious during the first few days. It is spread through the air by coughing and sneezing. It may also be spread by direct contact (touching the sick person and then touching your own eyes, nose, or mouth). Frequent handwashing will decrease risk of spread. Most viral illnesses go away within 14-21 days with rest and simple home remedies. Sometimes the illness may last for several weeks. Antibiotics will not kill a virus, and they are generally not prescribed for this condition.  Home care    If symptoms are severe, rest at home for the first 2 to 3 days. When you resume activity, don't let yourself get too tired.    Don't smoke. If you need help stopping, talk with your healthcare provider.    Avoid being exposed to cigarette smoke (yours or others ).    You may use acetaminophen or ibuprofen to control pain and fever, unless another medicine was prescribed. If you have chronic liver or kidney disease, have ever had a stomach ulcer or gastrointestinal bleeding, or are taking blood-thinning medicines, talk with your healthcare provider before using these medicines. Aspirin should never be given to anyone under 18 years of age who is ill with a viral infection or fever. It may cause severe liver or brain damage.    Your appetite may be poor, so a light diet is fine. Stay well hydrated by drinking 6 to 8 glasses of fluids per day (water, soft drinks, juices, tea, or soup). Extra fluids will help loosen secretions in the nose and lungs.    Over-the-counter cold medicines will not shorten the length of time you re sick, but they may be helpful for the following symptoms: cough, sore throat, and nasal and sinus congestion. If you take prescription medicines, ask your healthcare provider or pharmacist which over-the-counter medicines are safe to use.  (Note: Don't use decongestants if you have high blood pressure.)  Follow-up care  Follow up with your healthcare provider, or as advised.  When to seek medical advice  Call your healthcare provider right away if any of these occur:    Cough with lots of colored sputum (mucus)    Severe headache; face, neck, or ear pain    Difficulty swallowing due to throat pain    Fever of 100.4 F (38 C) or higher, or as directed by your healthcare provider  Call 911  Call 911 if any of these occur:    Chest pain, shortness of breath, wheezing, or difficulty breathing    Coughing up blood    Very severe pain with swallowing, especially if it goes along with a muffled voice   Date Last Reviewed: 6/1/2018 2000-2019 [employer]. [address] [place] [address], [place] [address]. All rights reserved. This information is not intended as a substitute for professional medical care. Always follow your healthcare professional's instructions.      Robitussin DM  Day/Nightquil for symptoms  Vicks rub to chest  Humidifier in room.     Symptoms likely due to virus. No antibiotic is needed at this time. Symptoms typically worse on days 2-5 and then stabilize and you are sick for days 5-12. Days 12-14 there is slow resolution and if there is a cough, studies show it can linger longer, however one is not as ill as in the beginning. If symptoms begin worsening or fail to improve after 14 days, return to clinic for reevaluation.

## 2020-02-22 NOTE — NURSING NOTE
Patient has not been feeling well for 1 day. Their symptoms are cough, runny nose, congestion  and they are taking Nasacort, Albuterol inhaler.  Jess Cordero LPN LPN....................  2/22/2020   1:22 PM

## 2020-10-14 ENCOUNTER — OFFICE VISIT (OUTPATIENT)
Dept: FAMILY MEDICINE | Facility: OTHER | Age: 80
End: 2020-10-14
Attending: FAMILY MEDICINE
Payer: MEDICARE

## 2020-10-14 VITALS
BODY MASS INDEX: 22.22 KG/M2 | WEIGHT: 121.5 LBS | RESPIRATION RATE: 14 BRPM | DIASTOLIC BLOOD PRESSURE: 58 MMHG | TEMPERATURE: 98.2 F | OXYGEN SATURATION: 97 % | HEART RATE: 86 BPM | SYSTOLIC BLOOD PRESSURE: 102 MMHG

## 2020-10-14 DIAGNOSIS — Z71.1 WORRIED WELL: Primary | ICD-10-CM

## 2020-10-14 PROCEDURE — 99212 OFFICE O/P EST SF 10 MIN: CPT | Performed by: FAMILY MEDICINE

## 2020-10-14 PROCEDURE — G0463 HOSPITAL OUTPT CLINIC VISIT: HCPCS

## 2020-10-14 RX ORDER — INFLUENZA A VIRUS A/MICHIGAN/45/2015 X-275 (H1N1) ANTIGEN (FORMALDEHYDE INACTIVATED), INFLUENZA A VIRUS A/SINGAPORE/INFIMH-16-0019/2016 IVR-186 (H3N2) ANTIGEN (FORMALDEHYDE INACTIVATED), INFLUENZA B VIRUS B/PHUKET/3073/2013 ANTIGEN (FORMALDEHYDE INACTIVATED), AND INFLUENZA B VIRUS B/MARYLAND/15/2016 BX-69A ANTIGEN (FORMALDEHYDE INACTIVATED) 60; 60; 60; 60 UG/.7ML; UG/.7ML; UG/.7ML; UG/.7ML
INJECTION, SUSPENSION INTRAMUSCULAR
COMMUNITY
Start: 2020-08-20 | End: 2021-03-25

## 2020-10-14 ASSESSMENT — PAIN SCALES - GENERAL: PAINLEVEL: MILD PAIN (3)

## 2020-10-14 NOTE — PROGRESS NOTES
Nursing Notes:   Keeley Stoner CMA  10/14/2020  4:24 PM  Sign at exiting of workspace  Patient presents to the clinic for left ear check. Patient believes she got part of her hearing aid stuck in her ear yesterday.   Medication Reconciliation: complete    Keeley Stoner CMA      SUBJECTIVE:  Carolyne Medley is a 80 year old female here with concerns about a piece of her hearing aid being in the canal. Has a small white piece that was missing on removal today. No pain. Also want both ears checked for wax. No hearing changes.     Social History     Social History Narrative    , [place].   Has one daughter and lives in the [place] area.       OBJECTIVE:  /58 (BP Location: Left arm, Patient Position: Sitting, Cuff Size: Adult Regular)   Pulse 86   Temp 98.2  F (36.8  C) (Tympanic)   Resp 14   Wt 55.1 kg (121 lb 8 oz)   LMP  (LMP Unknown)   SpO2 97%   Breastfeeding No   BMI 22.22 kg/m    General appearance: healthy and alert.    Ears: normal canals bilaterally and no FB in either canal. Minimal cerumen and no irrigation needed.  TMs normal.     ASSESSMENT:  1. Worried well        PLAN:  Normal ear exam and no FB identified.  Follow up as needed.  Jackie Gtz MD  4:41 PM 10/14/2020

## 2020-10-14 NOTE — NURSING NOTE
Patient presents to the clinic for left ear check. Patient believes she got part of her hearing aid stuck in her ear yesterday.   Medication Reconciliation: complete    Keeley Stoner, CMA

## 2021-03-25 ENCOUNTER — OFFICE VISIT (OUTPATIENT)
Dept: FAMILY MEDICINE | Facility: OTHER | Age: 81
End: 2021-03-25
Attending: NURSE PRACTITIONER
Payer: MEDICARE

## 2021-03-25 VITALS
WEIGHT: 129.3 LBS | OXYGEN SATURATION: 98 % | RESPIRATION RATE: 16 BRPM | TEMPERATURE: 98.2 F | DIASTOLIC BLOOD PRESSURE: 62 MMHG | BODY MASS INDEX: 24.41 KG/M2 | HEIGHT: 61 IN | SYSTOLIC BLOOD PRESSURE: 100 MMHG | HEART RATE: 74 BPM

## 2021-03-25 DIAGNOSIS — L98.9 SKIN LESION: Primary | ICD-10-CM

## 2021-03-25 PROCEDURE — 99212 OFFICE O/P EST SF 10 MIN: CPT | Performed by: NURSE PRACTITIONER

## 2021-03-25 PROCEDURE — G0463 HOSPITAL OUTPT CLINIC VISIT: HCPCS

## 2021-03-25 ASSESSMENT — MIFFLIN-ST. JEOR: SCORE: 993.88

## 2021-03-25 ASSESSMENT — PAIN SCALES - GENERAL: PAINLEVEL: NO PAIN (0)

## 2021-03-25 NOTE — NURSING NOTE
"Chief Complaint   Patient presents with     Derm Problem     Patient is here for a scaly patch of skin on the back of her right thigh that started a week ago. Patient states at times it feels like she's sitting on a marble. Patient has tried lotion and hydrocortisone cream with no relief.     Initial /62   Pulse 74   Temp 98.2  F (36.8  C) (Tympanic)   Resp 16   Ht 1.549 m (5' 1\")   Wt 58.7 kg (129 lb 4.8 oz)   LMP  (LMP Unknown)   SpO2 98%   BMI 24.43 kg/m   Estimated body mass index is 24.43 kg/m  as calculated from the following:    Height as of this encounter: 1.549 m (5' 1\").    Weight as of this encounter: 58.7 kg (129 lb 4.8 oz).  Medication Reconciliation: complete    Judy Daley, TRACEE  "

## 2021-03-25 NOTE — PROGRESS NOTES
HPI:    Carolyne Medley is a 80 year old female  who presents to [facility] today for skin concern.    States she has a scaly patch of skin on the back of her right thigh that she noted about a week about that has been bothering her.  She states that at times it feels like she is sitting on a marble.  States the area is not particularly painful or itchy.  No drainage, bleeding.  Area is not changing is size or markings.  No known tick bites.  No other skin lesions.  No fevers.  She has tried lotion and hydrocortisone cream without relief.      Past Medical History:   Diagnosis Date     Personal history of diseases of skin or subcutaneous tissue     No Comments Provided     Personal history of other medical treatment (CODE)          Past Surgical History:   Procedure Laterality Date     Cytocele repair      [place]      HYSTERECTOMY TOTAL ABDOMINAL, BILATERAL SALPINGO-OOPHORECTOMY, COMBINED      [place] With bladder suspension     Social History     Tobacco Use     Smoking status: Former Smoker     Packs/day: 0.00     Quit date: 1964     Years since quittin.2     Smokeless tobacco: Never Used   Substance Use Topics     Alcohol use: Yes     Alcohol/week: 0.8 standard drinks     Comment: Alcoholic Drinks/day: occasional     Current Outpatient Medications   Medication Sig Dispense Refill     Multiple Vitamin (MULTI-VITAMINS) TABS Take 1 tablet by mouth daily       albuterol (PROAIR HFA/PROVENTIL HFA/VENTOLIN HFA) 108 (90 Base) MCG/ACT inhaler Inhale 2 puffs into the lungs every 6 hours as needed for shortness of breath / dyspnea (Patient not taking: Reported on 3/25/2021) 1 Inhaler 0     naproxen (NAPROSYN) 500 MG tablet Take 1 tablet by mouth daily as needed       Allergies   Allergen Reactions     Food      Fruits and vegetables   Fruits and vegetables   Bananas, strawberries any fruit raw    Causes rash and makes swallowing difficult, and itchy spots     Penicillin G       "rash     Latex Rash     Nickel Rash     Rash,runny nose, throat issue     Penicillins Rash         Past medical history, past surgical history, current medications and allergies reviewed and accurate to the best of my knowledge.        ROS:  Refer to HPI    /62   Pulse 74   Temp 98.2  F (36.8  C) (Tympanic)   Resp 16   Ht 1.549 m (5' 1\")   Wt 58.7 kg (129 lb 4.8 oz)   LMP  (LMP Unknown)   SpO2 98%   BMI 24.43 kg/m      EXAM:  General Appearance: Well appearing elderly female, appropriate appearance for age. No acute distress  Respiratory: normal chest wall and respirations.  Normal effort. No cough appreciated.  Musculoskeletal:  Equal movement of bilateral upper extremities.  Equal movement of bilateral lower extremities.  Normal gait.    Dermatological: Right posterior lateral mid thigh with 1 cm x 1.5 cm oval shaped erythematous scaly flat lesion without surrounding erythema, no induration or fluctuance, no drainage or crusting, no bleeding, no excoriation.     Psychological: normal affect, alert, oriented, and pleasant.       ASSESSMENT/PLAN:    I have reviewed the nursing notes.  I have reviewed the findings, diagnosis, plan and need for follow up with the patient.    1. Skin lesion    Right thigh with single erythematous scaly skin lesion.  Patient concerned about possible skin cancer - looks benign, more likely resolving spider bite or localized eczema as it is erythematous and scaly not flesh colored, not infectious appearing.  Recommend using OTC hydrocortisone and antibiotic ointment and follow up in 2 weeks if not resolved.          I explained my diagnostic considerations and recommendations to the patient, who voiced understanding and agreement with the treatment plan. All questions were answered. We discussed potential side effects of any prescribed or recommended therapies, as well as expectations for response to treatments.    Disclaimer:  This note consists of words and symbols derived " from keyboarding, dictation, or using voice recognition software. As a result, there may be errors in the script that have gone undetected. Please consider this when interpreting information found in this note.

## 2021-03-30 ENCOUNTER — PATIENT OUTREACH (OUTPATIENT)
Dept: FAMILY MEDICINE | Facility: OTHER | Age: 81
End: 2021-03-30

## 2021-03-30 NOTE — TELEPHONE ENCOUNTER
Patient Quality Outreach      Summary:    Patient has the following on her problem list/HM:   Asthma review     No flowsheet data found.       Patient is due/failing the following:   ACT needed and Asthma follow-up visit    Type of outreach:    Sent letter.    Questions for provider review:    None                                                                                                                                     Shilpa Muñoz PA-C  3/30/2021  1:24 PM         Chart routed to N/A.

## 2021-06-07 ENCOUNTER — TELEPHONE (OUTPATIENT)
Dept: FAMILY MEDICINE | Facility: OTHER | Age: 81
End: 2021-06-07

## 2021-06-07 NOTE — TELEPHONE ENCOUNTER
Patient does not have a primary doctor. Patient declined scheduling an appointment. She currently wears hearing aids and they still work but she was told to have an updated hearing test done. She will call back if she needs to schedule an appointment with us.    Deb Oliveira LPN on 6/7/2021 at 2:05 PM

## 2021-11-01 ENCOUNTER — OFFICE VISIT (OUTPATIENT)
Dept: FAMILY MEDICINE | Facility: OTHER | Age: 81
End: 2021-11-01
Attending: NURSE PRACTITIONER
Payer: MEDICARE

## 2021-11-01 VITALS
OXYGEN SATURATION: 98 % | BODY MASS INDEX: 25.02 KG/M2 | HEART RATE: 72 BPM | SYSTOLIC BLOOD PRESSURE: 120 MMHG | RESPIRATION RATE: 16 BRPM | WEIGHT: 132.4 LBS | DIASTOLIC BLOOD PRESSURE: 62 MMHG | TEMPERATURE: 98.3 F

## 2021-11-01 DIAGNOSIS — T16.1XXA EAR FOREIGN BODY, RIGHT, INITIAL ENCOUNTER: Primary | ICD-10-CM

## 2021-11-01 PROCEDURE — 10120 INC&RMVL FB SUBQ TISS SMPL: CPT | Performed by: NURSE PRACTITIONER

## 2021-11-01 PROCEDURE — G0463 HOSPITAL OUTPT CLINIC VISIT: HCPCS | Mod: 25 | Performed by: NURSE PRACTITIONER

## 2021-11-01 PROCEDURE — 99212 OFFICE O/P EST SF 10 MIN: CPT | Mod: 25 | Performed by: NURSE PRACTITIONER

## 2021-11-01 ASSESSMENT — PAIN SCALES - GENERAL: PAINLEVEL: NO PAIN (0)

## 2021-11-02 NOTE — PROGRESS NOTES
ASSESSMENT/PLAN:    I have reviewed the nursing notes.  I have reviewed the findings, diagnosis, plan and need for follow up with the patient.    1. Ear foreign body, right, initial encounter  - REMOVE FOREIGN BODY SIMPLE  Part of hearing aide was stuck in ear, removed easily with alligator forceps   No cerumen impaction observed during ear check.   Patient gets new hearing aides tomorrow.     Discussed warning signs/symptoms indicative of need to f/u    Follow up if symptoms persist or worsen or concerns    I explained my diagnostic considerations and recommendations to the patient, who voiced understanding and agreement with the treatment plan. All questions were answered. We discussed potential side effects of any prescribed or recommended therapies, as well as expectations for response to treatments.    Amy Ruano NP  2021  7:57 PM    HPI:  Carolyne Medley is a 81 year old female  who presents to [facility] today for concerns of would like her ears checked for cerumen impaction, gets new hearing aides tomorrow.     She has a part of her hearing aide stuck in her ear canal, she was unaware of this prior to arrival.     Past Medical History:   Diagnosis Date     Personal history of diseases of skin or subcutaneous tissue     No Comments Provided     Personal history of other medical treatment (CODE)          Past Surgical History:   Procedure Laterality Date     Cytocele repair      [place]      HYSTERECTOMY TOTAL ABDOMINAL, BILATERAL SALPINGO-OOPHORECTOMY, COMBINED      [place]. With bladder suspension     Social History     Tobacco Use     Smoking status: Former Smoker     Packs/day: 0.00     Quit date: 1964     Years since quittin.8     Smokeless tobacco: Never Used   Substance Use Topics     Alcohol use: Yes     Alcohol/week: 0.8 standard drinks     Comment: Alcoholic Drinks/day: occasional     Current Outpatient Medications   Medication Sig Dispense Refill      albuterol (PROAIR HFA/PROVENTIL HFA/VENTOLIN HFA) 108 (90 Base) MCG/ACT inhaler Inhale 2 puffs into the lungs every 6 hours as needed for shortness of breath / dyspnea (Patient not taking: Reported on 3/25/2021) 1 Inhaler 0     Multiple Vitamin (MULTI-VITAMINS) TABS Take 1 tablet by mouth daily       naproxen (NAPROSYN) 500 MG tablet Take 1 tablet by mouth daily as needed       Allergies   Allergen Reactions     Food      Fruits and vegetables   Fruits and vegetables   Bananas, strawberries any fruit raw    Causes rash and makes swallowing difficult, and itchy spots     Penicillin G      rash     Latex Rash     Nickel Rash     Rash,runny nose, throat issue     Penicillins Rash     Past medical history, past surgical history, current medications and allergies reviewed and accurate to the best of my knowledge.      ROS:  Refer to HPI    /62 (BP Location: Left arm, Patient Position: Sitting, Cuff Size: Adult Regular)   Pulse 72   Temp 98.3  F (36.8  C) (Tympanic)   Resp 16   Wt 60.1 kg (132 lb 6.4 oz)   LMP  (LMP Unknown)   SpO2 98%   BMI 25.02 kg/m      EXAM:  General Appearance: Well appearing 81 year old female, appropriate appearance for age. No acute distress  Ears: Left TM intact, translucent with bony landmarks appreciated, no erythema, no effusion, no bulging, no purulence.  Right TM intact, translucent with bony landmarks appreciated, no erythema, no effusion, no bulging, no purulence.  Left auditory canal clear.  Right auditory canal clear.  Normal external ears, non tender.  Respiratory: No increased work of breathing.  No cough appreciated.  Psychological: normal affect, alert, oriented, and pleasant.

## 2022-05-21 ENCOUNTER — OFFICE VISIT (OUTPATIENT)
Dept: FAMILY MEDICINE | Facility: OTHER | Age: 82
End: 2022-05-21
Attending: NURSE PRACTITIONER
Payer: MEDICARE

## 2022-05-21 VITALS
SYSTOLIC BLOOD PRESSURE: 112 MMHG | BODY MASS INDEX: 23.24 KG/M2 | WEIGHT: 123 LBS | OXYGEN SATURATION: 98 % | HEART RATE: 72 BPM | RESPIRATION RATE: 16 BRPM | DIASTOLIC BLOOD PRESSURE: 64 MMHG | TEMPERATURE: 98.6 F

## 2022-05-21 DIAGNOSIS — J01.90 ACUTE BACTERIAL RHINOSINUSITIS: Primary | ICD-10-CM

## 2022-05-21 DIAGNOSIS — B96.89 ACUTE BACTERIAL RHINOSINUSITIS: Primary | ICD-10-CM

## 2022-05-21 PROCEDURE — 99213 OFFICE O/P EST LOW 20 MIN: CPT | Performed by: NURSE PRACTITIONER

## 2022-05-21 PROCEDURE — G0463 HOSPITAL OUTPT CLINIC VISIT: HCPCS | Performed by: NURSE PRACTITIONER

## 2022-05-21 NOTE — PROGRESS NOTES
ASSESSMENT/PLAN:    I have reviewed the nursing notes.  I have reviewed the findings, diagnosis, plan and need for follow up with the patient.    1. Acute bacterial rhinosinusitis  - amoxicillin-clavulanate (AUGMENTIN) 875-125 MG tablet; Take 1 tablet by mouth 2 times daily for 7 days  Dispense: 14 tablet; Refill: 0  Continue netti pot and flonase     Follow up if symptoms persist or worsen or concerns    I explained my diagnostic considerations and recommendations to the patient, who voiced understanding and agreement with the treatment plan. All questions were answered. We discussed potential side effects of any prescribed or recommended therapies, as well as expectations for response to treatments.    Amy Ruano NP  2022  5:26 PM    HPI:  Carolyne Medley is a 81 year old female who presents to [facility] today for concerns of possible sinus infection, allergies, face, head congestion, left ear pain. The symptoms started 7-10 days ago. Post nasal drip. Started as what she thought were allergies. Hx of sinus infections but it has been a long time since she has been ill. She has been rinsing her sinuses. Comes out bloody, thick yellow drainage. No fevers/chills. No known exposures to covid, influenza. She mostly stays home. No recent events.     She took some amoxicillin (850 mg twice daily for 5 days) of her 's prescription. She tolerated this fine. It almost took care of it but symptoms returned. She took the last dose of this on Wednesday. She is allergic to pcns.     Past Medical History:   Diagnosis Date     Personal history of diseases of skin or subcutaneous tissue     No Comments Provided     Personal history of other medical treatment (CODE)          Past Surgical History:   Procedure Laterality Date     Cytocele repair      [place]      HYSTERECTOMY TOTAL ABDOMINAL, BILATERAL SALPINGO-OOPHORECTOMY, COMBINED      [place]. With bladder suspension     Social  History     Tobacco Use     Smoking status: Former Smoker     Packs/day: 0.00     Quit date: 1964     Years since quittin.3     Smokeless tobacco: Never Used   Substance Use Topics     Alcohol use: Yes     Alcohol/week: 0.8 standard drinks     Comment: Alcoholic Drinks/day: occasional     Current Outpatient Medications   Medication Sig Dispense Refill     albuterol (PROAIR HFA/PROVENTIL HFA/VENTOLIN HFA) 108 (90 Base) MCG/ACT inhaler Inhale 2 puffs into the lungs every 6 hours as needed for shortness of breath / dyspnea 1 Inhaler 0     amoxicillin-clavulanate (AUGMENTIN) 875-125 MG tablet Take 1 tablet by mouth 2 times daily for 7 days 14 tablet 0     Multiple Vitamin (MULTI-VITAMINS) TABS Take 1 tablet by mouth daily       naproxen (NAPROSYN) 500 MG tablet Take 1 tablet by mouth daily as needed       Allergies   Allergen Reactions     Food      Fruits and vegetables   Fruits and vegetables   Bananas, strawberries any fruit raw    Causes rash and makes swallowing difficult, and itchy spots     Penicillin G      rash     Latex Rash     Nickel Rash     Rash,runny nose, throat issue     Penicillins Rash     Past medical history, past surgical history, current medications and allergies reviewed and accurate to the best of my knowledge.      ROS:  Refer to HPI    /64 (BP Location: Left arm, Patient Position: Sitting, Cuff Size: Adult Regular)   Pulse 72   Temp 98.6  F (37  C) (Tympanic)   Resp 16   Wt 55.8 kg (123 lb)   LMP  (LMP Unknown)   SpO2 98%   Breastfeeding No   BMI 23.24 kg/m      EXAM:  General Appearance: Well appearing 81 year old female, appropriate appearance for age. No acute distress   Ears: Left TM intact, translucent with bony landmarks appreciated, no erythema, no effusion, no bulging, no purulence.  Right TM intact, translucent with bony landmarks appreciated, no erythema, no effusion, no bulging, no purulence.  Left auditory canal clear.  Right auditory canal clear.  Normal  external ears, non tender.  Eyes: conjunctivae normal without erythema or irritation, corneas clear, no drainage or crusting, no eyelid swelling, pupils equal   Oropharynx: moist mucous membranes, posterior pharynx without erythema, tonsils symmetric, no erythema, no exudates or petechiae, no post nasal drip seen, voice clear.    Sinuses:  + sinus tenderness upon palpation of the bilateral frontal and maxillary sinuses more significant on the left   Nose:  Bilateral nares: no erythema, no edema, + no congestion   Neck: supple without adenopathy  Respiratory: normal chest wall and respirations.  Normal effort.  Clear to auscultation bilaterally, no wheezing, crackles or rhonchi.  No increased work of breathing.  No cough appreciated.  Cardiac: RRR with no murmurs  Psychological: normal affect, alert, oriented, and pleasant.

## 2022-05-21 NOTE — NURSING NOTE
"Chief Complaint   Patient presents with     Sinus Problem     Possible sinus infection, allergies; face, head congestion, L ear pain       Initial /64 (BP Location: Left arm, Patient Position: Sitting, Cuff Size: Adult Regular)   Pulse 72   Temp 98.6  F (37  C) (Tympanic)   Resp 16   Wt 55.8 kg (123 lb)   LMP  (LMP Unknown)   SpO2 98%   Breastfeeding No   BMI 23.24 kg/m   Estimated body mass index is 23.24 kg/m  as calculated from the following:    Height as of 3/25/21: 1.549 m (5' 1\").    Weight as of this encounter: 55.8 kg (123 lb).     Medication Reconciliation: complete      FOOD SECURITY SCREENING QUESTIONS:    The next two questions are to help us understand your food security.  If you are feeling you need any assistance in this area, we have resources available to support you today.    Hunger Vital Signs:  Within the past 12 months we worried whether our food would run out before we got money to buy more. Never  Within the past 12 months the food we bought just didn't last and we didn't have money to get more. Never      Advance care plan reviewed      Teri Steward LPN on 5/21/2022 at 5:20 PM      "

## (undated) RX ORDER — HYDROMORPHONE HYDROCHLORIDE 1 MG/ML
INJECTION, SOLUTION INTRAMUSCULAR; INTRAVENOUS; SUBCUTANEOUS
Status: DISPENSED
Start: 2019-09-11

## (undated) RX ORDER — ONDANSETRON 2 MG/ML
INJECTION INTRAMUSCULAR; INTRAVENOUS
Status: DISPENSED
Start: 2019-09-11

## (undated) RX ORDER — SODIUM CHLORIDE 9 MG/ML
INJECTION, SOLUTION INTRAVENOUS
Status: DISPENSED
Start: 2019-09-12

## (undated) RX ORDER — POTASSIUM CHLORIDE 7.45 MG/ML
INJECTION INTRAVENOUS
Status: DISPENSED
Start: 2019-09-11